# Patient Record
Sex: FEMALE | Race: WHITE | Employment: FULL TIME | URBAN - METROPOLITAN AREA
[De-identification: names, ages, dates, MRNs, and addresses within clinical notes are randomized per-mention and may not be internally consistent; named-entity substitution may affect disease eponyms.]

---

## 2017-11-06 PROBLEM — J30.9 ALLERGIC RHINITIS: Status: ACTIVE | Noted: 2017-11-06

## 2017-11-06 RX ORDER — ALBUTEROL SULFATE 90 UG/1
2 AEROSOL, METERED RESPIRATORY (INHALATION) 2 TIMES DAILY
COMMUNITY
Start: 2005-01-12

## 2018-06-18 ENCOUNTER — TELEPHONE (OUTPATIENT)
Dept: OBGYN CLINIC | Age: 23
End: 2018-06-18

## 2018-06-18 DIAGNOSIS — N91.2 AMENORRHEA: Primary | ICD-10-CM

## 2018-06-21 ENCOUNTER — TELEPHONE (OUTPATIENT)
Dept: OBGYN CLINIC | Age: 23
End: 2018-06-21

## 2018-06-22 ENCOUNTER — HOSPITAL ENCOUNTER (OUTPATIENT)
Dept: ULTRASOUND IMAGING | Age: 23
Discharge: HOME | End: 2018-06-24

## 2018-06-22 DIAGNOSIS — N91.2 AMENORRHEA: ICD-10-CM

## 2018-06-22 PROCEDURE — 76801 OB US < 14 WKS SINGLE FETUS: CPT

## 2018-06-22 PROCEDURE — 76817 TRANSVAGINAL US OBSTETRIC: CPT

## 2018-06-25 ENCOUNTER — TELEPHONE (OUTPATIENT)
Dept: OBGYN CLINIC | Age: 23
End: 2018-06-25

## 2020-01-26 ENCOUNTER — HOSPITAL ENCOUNTER (EMERGENCY)
Age: 25
Discharge: HOME | End: 2020-01-26
Payer: COMMERCIAL

## 2020-01-26 ENCOUNTER — APPOINTMENT (OUTPATIENT)
Dept: GENERAL RADIOLOGY | Age: 25
End: 2020-01-26
Payer: COMMERCIAL

## 2020-01-26 VITALS
RESPIRATION RATE: 18 BRPM | BODY MASS INDEX: 35.85 KG/M2 | DIASTOLIC BLOOD PRESSURE: 87 MMHG | TEMPERATURE: 99 F | SYSTOLIC BLOOD PRESSURE: 135 MMHG | HEART RATE: 69 BPM | WEIGHT: 210 LBS | HEIGHT: 64 IN | OXYGEN SATURATION: 100 %

## 2020-01-26 PROCEDURE — 73630 X-RAY EXAM OF FOOT: CPT

## 2020-01-26 PROCEDURE — 99283 EMERGENCY DEPT VISIT LOW MDM: CPT

## 2020-01-26 PROCEDURE — 6370000000 HC RX 637 (ALT 250 FOR IP): Performed by: NURSE PRACTITIONER

## 2020-01-26 RX ORDER — OXYCODONE HYDROCHLORIDE AND ACETAMINOPHEN 5; 325 MG/1; MG/1
1 TABLET ORAL ONCE
Status: COMPLETED | OUTPATIENT
Start: 2020-01-26 | End: 2020-01-26

## 2020-01-26 RX ORDER — NAPROXEN 500 MG/1
500 TABLET ORAL 2 TIMES DAILY
Qty: 20 TABLET | Refills: 0 | Status: SHIPPED | OUTPATIENT
Start: 2020-01-26 | End: 2020-02-05

## 2020-01-26 RX ORDER — LIDOCAINE HYDROCHLORIDE 10 MG/ML
5 INJECTION, SOLUTION EPIDURAL; INFILTRATION; INTRACAUDAL; PERINEURAL ONCE
Status: DISCONTINUED | OUTPATIENT
Start: 2020-01-26 | End: 2020-01-27 | Stop reason: HOSPADM

## 2020-01-26 RX ORDER — AMOXICILLIN AND CLAVULANATE POTASSIUM 875; 125 MG/1; MG/1
1 TABLET, FILM COATED ORAL 2 TIMES DAILY
Qty: 20 TABLET | Refills: 0 | Status: SHIPPED | OUTPATIENT
Start: 2020-01-26 | End: 2020-02-05

## 2020-01-26 RX ORDER — NAPROXEN 500 MG/1
500 TABLET ORAL ONCE
Status: COMPLETED | OUTPATIENT
Start: 2020-01-26 | End: 2020-01-26

## 2020-01-26 RX ORDER — AMOXICILLIN AND CLAVULANATE POTASSIUM 875; 125 MG/1; MG/1
1 TABLET, FILM COATED ORAL ONCE
Status: COMPLETED | OUTPATIENT
Start: 2020-01-26 | End: 2020-01-26

## 2020-01-26 RX ADMIN — NAPROXEN 500 MG: 500 TABLET ORAL at 21:10

## 2020-01-26 RX ADMIN — OXYCODONE HYDROCHLORIDE AND ACETAMINOPHEN 1 TABLET: 5; 325 TABLET ORAL at 22:04

## 2020-01-26 RX ADMIN — AMOXICILLIN AND CLAVULANATE POTASSIUM 1 TABLET: 875; 125 TABLET, FILM COATED ORAL at 21:10

## 2020-01-26 ASSESSMENT — ENCOUNTER SYMPTOMS
TROUBLE SWALLOWING: 0
BLOOD IN STOOL: 0
SORE THROAT: 0
EYE DISCHARGE: 0
DIARRHEA: 0
COUGH: 0
WHEEZING: 0
NAUSEA: 0
EYE PAIN: 0
EYE REDNESS: 0
BACK PAIN: 0
COLOR CHANGE: 0
SHORTNESS OF BREATH: 0
RHINORRHEA: 0
VOMITING: 0
ABDOMINAL PAIN: 0
CONSTIPATION: 0

## 2020-01-26 ASSESSMENT — PAIN DESCRIPTION - ORIENTATION: ORIENTATION: RIGHT;LOWER

## 2020-01-26 ASSESSMENT — PAIN SCALES - GENERAL
PAINLEVEL_OUTOF10: 2

## 2020-01-26 ASSESSMENT — PAIN DESCRIPTION - PAIN TYPE: TYPE: ACUTE PAIN

## 2020-01-26 ASSESSMENT — PAIN DESCRIPTION - LOCATION: LOCATION: FOOT

## 2020-01-27 NOTE — ED PROVIDER NOTES
dysuria, flank pain and hematuria. Musculoskeletal: Positive for myalgias. Negative for arthralgias and back pain. Skin: Negative for color change, rash and wound. Neurological: Negative for dizziness, syncope, weakness, light-headedness and headaches. Psychiatric/Behavioral: Negative for behavioral problems. All other systems reviewed and are negative. Except as noted above the remainder of the review of systems was reviewed and negative. PAST MEDICAL HISTORY   History reviewed. No pertinent past medical history. History reviewed. No pertinent surgical history. Social History     Socioeconomic History    Marital status: Single     Spouse name: None    Number of children: None    Years of education: None    Highest education level: None   Occupational History    None   Social Needs    Financial resource strain: None    Food insecurity:     Worry: None     Inability: None    Transportation needs:     Medical: None     Non-medical: None   Tobacco Use    Smoking status: Current Every Day Smoker    Smokeless tobacco: Never Used   Substance and Sexual Activity    Alcohol use:  Yes    Drug use: Never    Sexual activity: None   Lifestyle    Physical activity:     Days per week: None     Minutes per session: None    Stress: None   Relationships    Social connections:     Talks on phone: None     Gets together: None     Attends Yazidi service: None     Active member of club or organization: None     Attends meetings of clubs or organizations: None     Relationship status: None    Intimate partner violence:     Fear of current or ex partner: None     Emotionally abused: None     Physically abused: None     Forced sexual activity: None   Other Topics Concern    None   Social History Narrative    None       SCREENINGS             PHYSICAL EXAM    (up to 7 for level 4, 8 or more for level 5)     ED Triage Vitals [01/26/20 2025]   BP Temp Temp Source Pulse Resp SpO2 Height Weight none    LABS:  Labs Reviewed - No data to display    All other labs were within normal range or not returned as of this dictation. EMERGENCY DEPARTMENT COURSE and DIFFERENTIAL DIAGNOSIS/MDM:   Vitals:    Vitals:    01/26/20 2025   BP: 135/87   Pulse: 69   Resp: 18   Temp: 99 °F (37.2 °C)   TempSrc: Oral   SpO2: 100%   Weight: 210 lb (95.3 kg)   Height: 5' 4\" (1.626 m)            MDM   Patient is a 20-year-old female presenting to the ER for chief complaint of a foreign body in her right. She is hemodynamically stable, nontoxic-appearing, does not appear to be in any acute distress. Patient's x-ray is clear. She is medicated with Percocet and Naprosyn for her pain. A small incision, approximately 2 to 3 mm made in the site of erythema to relieve pressure and ensure no foreign body is superficially under the skin. No foreign body identified. Patient is given a prescription for Augmentin and first dose in the emergency department. She is also given a prescription for Naprosyn. Direct pressure applied to the area of incision until it no longer was bleeding. Patient is instructed to keep the dressing on it. She instructed to follow-up with her primary care doctor and to return back if her symptoms worsen anyway or new symptoms develop. Discharged in stable condition with stable vital signs. CRITICAL CARE TIME       CONSULTS:  None    PROCEDURES:  Unless otherwise noted below, none     Procedures    FINAL IMPRESSION      1.  Foreign body in right foot, initial encounter          DISPOSITION/PLAN   DISPOSITION Decision To Discharge 01/26/2020 09:55:23 PM      PATIENT REFERRED TO:  Justin Antoine, [address] [place] [address],[address] [facility]  [address] [place] [address]  [phone]    Schedule an appointment as soon as possible for a visit in 1 day        DISCHARGE MEDICATIONS:  New Prescriptions    AMOXICILLIN-CLAVULANATE (AUGMENTIN) 875-125 MG PER TABLET    Take 1 tablet by mouth 2 times daily for 10 days NAPROXEN (NAPROSYN) 500 MG TABLET    Take 1 tablet by mouth 2 times daily for 20 doses          (Please notethat portions of this note were completed with a voice recognition program.  Efforts were made to edit the dictations but occasionally words are mis-transcribed.)    JAMES Wei CNP (electronically signed)  Attending Emergency Physician         [facility], APRN - CNP  01/26/20 4441
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
## 2023-12-26 ENCOUNTER — APPOINTMENT (OUTPATIENT)
Dept: RADIOLOGY | Facility: HOSPITAL | Age: 28
End: 2023-12-26

## 2023-12-26 ENCOUNTER — ANESTHESIA (OUTPATIENT)
Dept: OPERATING ROOM | Facility: HOSPITAL | Age: 28
End: 2023-12-26

## 2023-12-26 ENCOUNTER — HOSPITAL ENCOUNTER (OUTPATIENT)
Facility: HOSPITAL | Age: 28
Setting detail: OBSERVATION
Discharge: HOME | End: 2023-12-26
Attending: STUDENT IN AN ORGANIZED HEALTH CARE EDUCATION/TRAINING PROGRAM | Admitting: STUDENT IN AN ORGANIZED HEALTH CARE EDUCATION/TRAINING PROGRAM

## 2023-12-26 ENCOUNTER — HOSPITAL ENCOUNTER (EMERGENCY)
Facility: HOSPITAL | Age: 28
Discharge: OTHER INSTITUTION | End: 2023-12-26
Attending: EMERGENCY MEDICINE

## 2023-12-26 ENCOUNTER — ANESTHESIA EVENT (OUTPATIENT)
Dept: OPERATING ROOM | Facility: HOSPITAL | Age: 28
End: 2023-12-26

## 2023-12-26 VITALS
OXYGEN SATURATION: 95 % | HEART RATE: 69 BPM | TEMPERATURE: 98.4 F | HEIGHT: 64 IN | RESPIRATION RATE: 18 BRPM | WEIGHT: 220 LBS | BODY MASS INDEX: 37.56 KG/M2 | SYSTOLIC BLOOD PRESSURE: 91 MMHG | DIASTOLIC BLOOD PRESSURE: 66 MMHG

## 2023-12-26 VITALS
TEMPERATURE: 97.8 F | HEART RATE: 93 BPM | WEIGHT: 220 LBS | SYSTOLIC BLOOD PRESSURE: 139 MMHG | RESPIRATION RATE: 26 BRPM | HEIGHT: 64 IN | BODY MASS INDEX: 37.56 KG/M2 | DIASTOLIC BLOOD PRESSURE: 76 MMHG | OXYGEN SATURATION: 100 %

## 2023-12-26 DIAGNOSIS — R10.30 LOWER ABDOMINAL PAIN: ICD-10-CM

## 2023-12-26 DIAGNOSIS — K65.9 PERITONITIS (MULTI): ICD-10-CM

## 2023-12-26 DIAGNOSIS — Z3A.01 LESS THAN 8 WEEKS GESTATION OF PREGNANCY (HHS-HCC): ICD-10-CM

## 2023-12-26 DIAGNOSIS — R10.31 RLQ ABDOMINAL PAIN: Primary | ICD-10-CM

## 2023-12-26 DIAGNOSIS — O36.80X0 PREGNANCY OF UNKNOWN ANATOMIC LOCATION (HHS-HCC): Primary | ICD-10-CM

## 2023-12-26 PROBLEM — E66.09 OTHER OBESITY DUE TO EXCESS CALORIES: Status: ACTIVE | Noted: 2023-12-26

## 2023-12-26 PROBLEM — N20.0 NEPHROLITHIASIS: Status: ACTIVE | Noted: 2023-12-26

## 2023-12-26 LAB
ABO GROUP (TYPE) IN BLOOD: NORMAL
ABO GROUP (TYPE) IN BLOOD: NORMAL
ALBUMIN SERPL BCP-MCNC: 4.4 G/DL (ref 3.4–5)
ALP SERPL-CCNC: 59 U/L (ref 33–110)
ALT SERPL W P-5'-P-CCNC: 11 U/L (ref 7–45)
ANION GAP SERPL CALC-SCNC: 12 MMOL/L (ref 10–20)
ANION GAP SERPL CALC-SCNC: 13 MMOL/L (ref 10–20)
ANTIBODY SCREEN: NORMAL
AST SERPL W P-5'-P-CCNC: 13 U/L (ref 9–39)
B-HCG SERPL-ACNC: 2492 MIU/ML
BASOPHILS # BLD AUTO: 0.02 X10*3/UL (ref 0–0.1)
BASOPHILS NFR BLD AUTO: 0.2 %
BILIRUB SERPL-MCNC: 0.5 MG/DL (ref 0–1.2)
BUN SERPL-MCNC: 7 MG/DL (ref 6–23)
BUN SERPL-MCNC: 8 MG/DL (ref 6–23)
CALCIUM SERPL-MCNC: 9.2 MG/DL (ref 8.6–10.6)
CALCIUM SERPL-MCNC: 9.6 MG/DL (ref 8.6–10.3)
CHLORIDE SERPL-SCNC: 102 MMOL/L (ref 98–107)
CHLORIDE SERPL-SCNC: 104 MMOL/L (ref 98–107)
CO2 SERPL-SCNC: 25 MMOL/L (ref 21–32)
CO2 SERPL-SCNC: 25 MMOL/L (ref 21–32)
CREAT SERPL-MCNC: 0.71 MG/DL (ref 0.5–1.05)
CREAT SERPL-MCNC: 0.74 MG/DL (ref 0.5–1.05)
EOSINOPHIL # BLD AUTO: 0.04 X10*3/UL (ref 0–0.7)
EOSINOPHIL NFR BLD AUTO: 0.4 %
ERYTHROCYTE [DISTWIDTH] IN BLOOD BY AUTOMATED COUNT: 13.3 % (ref 11.5–14.5)
ERYTHROCYTE [DISTWIDTH] IN BLOOD BY AUTOMATED COUNT: 13.4 % (ref 11.5–14.5)
ERYTHROCYTE [DISTWIDTH] IN BLOOD BY AUTOMATED COUNT: 13.4 % (ref 11.5–14.5)
GFR SERPL CREATININE-BSD FRML MDRD: >90 ML/MIN/1.73M*2
GFR SERPL CREATININE-BSD FRML MDRD: >90 ML/MIN/1.73M*2
GLUCOSE SERPL-MCNC: 74 MG/DL (ref 74–99)
GLUCOSE SERPL-MCNC: 77 MG/DL (ref 74–99)
HCT VFR BLD AUTO: 34.8 % (ref 36–46)
HCT VFR BLD AUTO: 38 % (ref 36–46)
HCT VFR BLD AUTO: 40.6 % (ref 36–46)
HGB BLD-MCNC: 11.3 G/DL (ref 12–16)
HGB BLD-MCNC: 12.3 G/DL (ref 12–16)
HGB BLD-MCNC: 13.5 G/DL (ref 12–16)
IMM GRANULOCYTES # BLD AUTO: 0.05 X10*3/UL (ref 0–0.7)
IMM GRANULOCYTES NFR BLD AUTO: 0.6 % (ref 0–0.9)
LYMPHOCYTES # BLD AUTO: 2.89 X10*3/UL (ref 1.2–4.8)
LYMPHOCYTES NFR BLD AUTO: 32 %
MAGNESIUM SERPL-MCNC: 1.97 MG/DL (ref 1.6–2.4)
MCH RBC QN AUTO: 28 PG (ref 26–34)
MCH RBC QN AUTO: 28.3 PG (ref 26–34)
MCH RBC QN AUTO: 28.3 PG (ref 26–34)
MCHC RBC AUTO-ENTMCNC: 32.4 G/DL (ref 32–36)
MCHC RBC AUTO-ENTMCNC: 32.5 G/DL (ref 32–36)
MCHC RBC AUTO-ENTMCNC: 33.3 G/DL (ref 32–36)
MCV RBC AUTO: 85 FL (ref 80–100)
MCV RBC AUTO: 87 FL (ref 80–100)
MCV RBC AUTO: 87 FL (ref 80–100)
MONOCYTES # BLD AUTO: 0.53 X10*3/UL (ref 0.1–1)
MONOCYTES NFR BLD AUTO: 5.9 %
NEUTROPHILS # BLD AUTO: 5.5 X10*3/UL (ref 1.2–7.7)
NEUTROPHILS NFR BLD AUTO: 60.9 %
NRBC BLD-RTO: 0 /100 WBCS (ref 0–0)
PLATELET # BLD AUTO: 278 X10*3/UL (ref 150–450)
PLATELET # BLD AUTO: 279 X10*3/UL (ref 150–450)
PLATELET # BLD AUTO: 317 X10*3/UL (ref 150–450)
POTASSIUM SERPL-SCNC: 3.6 MMOL/L (ref 3.5–5.3)
POTASSIUM SERPL-SCNC: 4 MMOL/L (ref 3.5–5.3)
PROT SERPL-MCNC: 7.1 G/DL (ref 6.4–8.2)
RBC # BLD AUTO: 3.99 X10*6/UL (ref 4–5.2)
RBC # BLD AUTO: 4.39 X10*6/UL (ref 4–5.2)
RBC # BLD AUTO: 4.77 X10*6/UL (ref 4–5.2)
RH FACTOR (ANTIGEN D): NORMAL
RH FACTOR (ANTIGEN D): NORMAL
SODIUM SERPL-SCNC: 136 MMOL/L (ref 136–145)
SODIUM SERPL-SCNC: 137 MMOL/L (ref 136–145)
WBC # BLD AUTO: 11.3 X10*3/UL (ref 4.4–11.3)
WBC # BLD AUTO: 12.1 X10*3/UL (ref 4.4–11.3)
WBC # BLD AUTO: 9 X10*3/UL (ref 4.4–11.3)

## 2023-12-26 PROCEDURE — 84702 CHORIONIC GONADOTROPIN TEST: CPT | Performed by: EMERGENCY MEDICINE

## 2023-12-26 PROCEDURE — 83735 ASSAY OF MAGNESIUM: CPT | Performed by: EMERGENCY MEDICINE

## 2023-12-26 PROCEDURE — 3600000008 HC OR TIME - EACH INCREMENTAL 1 MINUTE - PROCEDURE LEVEL THREE: Performed by: STUDENT IN AN ORGANIZED HEALTH CARE EDUCATION/TRAINING PROGRAM

## 2023-12-26 PROCEDURE — 2500000004 HC RX 250 GENERAL PHARMACY W/ HCPCS (ALT 636 FOR OP/ED)

## 2023-12-26 PROCEDURE — 80048 BASIC METABOLIC PNL TOTAL CA: CPT | Performed by: STUDENT IN AN ORGANIZED HEALTH CARE EDUCATION/TRAINING PROGRAM

## 2023-12-26 PROCEDURE — 36415 COLL VENOUS BLD VENIPUNCTURE: CPT | Performed by: EMERGENCY MEDICINE

## 2023-12-26 PROCEDURE — 76817 TRANSVAGINAL US OBSTETRIC: CPT

## 2023-12-26 PROCEDURE — 7100000002 HC RECOVERY ROOM TIME - EACH INCREMENTAL 1 MINUTE: Performed by: STUDENT IN AN ORGANIZED HEALTH CARE EDUCATION/TRAINING PROGRAM

## 2023-12-26 PROCEDURE — 88305 TISSUE EXAM BY PATHOLOGIST: CPT | Mod: TC,SUR | Performed by: STUDENT IN AN ORGANIZED HEALTH CARE EDUCATION/TRAINING PROGRAM

## 2023-12-26 PROCEDURE — 7100000001 HC RECOVERY ROOM TIME - INITIAL BASE CHARGE: Performed by: STUDENT IN AN ORGANIZED HEALTH CARE EDUCATION/TRAINING PROGRAM

## 2023-12-26 PROCEDURE — 99140 ANES COMP EMERGENCY COND: CPT | Performed by: STUDENT IN AN ORGANIZED HEALTH CARE EDUCATION/TRAINING PROGRAM

## 2023-12-26 PROCEDURE — 99222 1ST HOSP IP/OBS MODERATE 55: CPT | Performed by: STUDENT IN AN ORGANIZED HEALTH CARE EDUCATION/TRAINING PROGRAM

## 2023-12-26 PROCEDURE — 3600000003 HC OR TIME - INITIAL BASE CHARGE - PROCEDURE LEVEL THREE: Performed by: STUDENT IN AN ORGANIZED HEALTH CARE EDUCATION/TRAINING PROGRAM

## 2023-12-26 PROCEDURE — 96365 THER/PROPH/DIAG IV INF INIT: CPT

## 2023-12-26 PROCEDURE — 2500000001 HC RX 250 WO HCPCS SELF ADMINISTERED DRUGS (ALT 637 FOR MEDICARE OP): Performed by: STUDENT IN AN ORGANIZED HEALTH CARE EDUCATION/TRAINING PROGRAM

## 2023-12-26 PROCEDURE — 85025 COMPLETE CBC W/AUTO DIFF WBC: CPT | Performed by: EMERGENCY MEDICINE

## 2023-12-26 PROCEDURE — 96361 HYDRATE IV INFUSION ADD-ON: CPT

## 2023-12-26 PROCEDURE — G0378 HOSPITAL OBSERVATION PER HR: HCPCS

## 2023-12-26 PROCEDURE — 76801 OB US < 14 WKS SINGLE FETUS: CPT

## 2023-12-26 PROCEDURE — 87086 URINE CULTURE/COLONY COUNT: CPT | Performed by: STUDENT IN AN ORGANIZED HEALTH CARE EDUCATION/TRAINING PROGRAM

## 2023-12-26 PROCEDURE — G0379 DIRECT REFER HOSPITAL OBSERV: HCPCS

## 2023-12-26 PROCEDURE — A4217 STERILE WATER/SALINE, 500 ML: HCPCS | Performed by: STUDENT IN AN ORGANIZED HEALTH CARE EDUCATION/TRAINING PROGRAM

## 2023-12-26 PROCEDURE — 3700000001 HC GENERAL ANESTHESIA TIME - INITIAL BASE CHARGE: Performed by: STUDENT IN AN ORGANIZED HEALTH CARE EDUCATION/TRAINING PROGRAM

## 2023-12-26 PROCEDURE — 2500000004 HC RX 250 GENERAL PHARMACY W/ HCPCS (ALT 636 FOR OP/ED): Performed by: STUDENT IN AN ORGANIZED HEALTH CARE EDUCATION/TRAINING PROGRAM

## 2023-12-26 PROCEDURE — 86850 RBC ANTIBODY SCREEN: CPT | Performed by: STUDENT IN AN ORGANIZED HEALTH CARE EDUCATION/TRAINING PROGRAM

## 2023-12-26 PROCEDURE — 86901 BLOOD TYPING SEROLOGIC RH(D): CPT

## 2023-12-26 PROCEDURE — 3700000002 HC GENERAL ANESTHESIA TIME - EACH INCREMENTAL 1 MINUTE: Performed by: STUDENT IN AN ORGANIZED HEALTH CARE EDUCATION/TRAINING PROGRAM

## 2023-12-26 PROCEDURE — 88305 TISSUE EXAM BY PATHOLOGIST: CPT | Performed by: PATHOLOGY

## 2023-12-26 PROCEDURE — 86901 BLOOD TYPING SEROLOGIC RH(D): CPT | Performed by: STUDENT IN AN ORGANIZED HEALTH CARE EDUCATION/TRAINING PROGRAM

## 2023-12-26 PROCEDURE — 96374 THER/PROPH/DIAG INJ IV PUSH: CPT | Mod: 59

## 2023-12-26 PROCEDURE — 85027 COMPLETE CBC AUTOMATED: CPT | Performed by: EMERGENCY MEDICINE

## 2023-12-26 PROCEDURE — 58661 LAPAROSCOPY REMOVE ADNEXA: CPT | Performed by: STUDENT IN AN ORGANIZED HEALTH CARE EDUCATION/TRAINING PROGRAM

## 2023-12-26 PROCEDURE — 76815 OB US LIMITED FETUS(S): CPT

## 2023-12-26 PROCEDURE — 80053 COMPREHEN METABOLIC PANEL: CPT | Performed by: EMERGENCY MEDICINE

## 2023-12-26 PROCEDURE — 96376 TX/PRO/DX INJ SAME DRUG ADON: CPT

## 2023-12-26 PROCEDURE — 2500000004 HC RX 250 GENERAL PHARMACY W/ HCPCS (ALT 636 FOR OP/ED): Performed by: EMERGENCY MEDICINE

## 2023-12-26 PROCEDURE — 2720000007 HC OR 272 NO HCPCS: Performed by: STUDENT IN AN ORGANIZED HEALTH CARE EDUCATION/TRAINING PROGRAM

## 2023-12-26 PROCEDURE — 99285 EMERGENCY DEPT VISIT HI MDM: CPT | Performed by: EMERGENCY MEDICINE

## 2023-12-26 PROCEDURE — 96375 TX/PRO/DX INJ NEW DRUG ADDON: CPT

## 2023-12-26 PROCEDURE — A59151 PR RX ECTOP PREG BY SCOPE,RMV TUBE/OVRY: Performed by: STUDENT IN AN ORGANIZED HEALTH CARE EDUCATION/TRAINING PROGRAM

## 2023-12-26 PROCEDURE — 85027 COMPLETE CBC AUTOMATED: CPT | Performed by: STUDENT IN AN ORGANIZED HEALTH CARE EDUCATION/TRAINING PROGRAM

## 2023-12-26 RX ORDER — ACETAMINOPHEN 325 MG/1
975 TABLET ORAL ONCE
Status: COMPLETED | OUTPATIENT
Start: 2023-12-26 | End: 2023-12-26

## 2023-12-26 RX ORDER — ONDANSETRON HYDROCHLORIDE 2 MG/ML
4 INJECTION, SOLUTION INTRAVENOUS ONCE
Status: COMPLETED | OUTPATIENT
Start: 2023-12-26 | End: 2023-12-26

## 2023-12-26 RX ORDER — FENTANYL CITRATE 50 UG/ML
INJECTION, SOLUTION INTRAMUSCULAR; INTRAVENOUS AS NEEDED
Status: DISCONTINUED | OUTPATIENT
Start: 2023-12-26 | End: 2023-12-26

## 2023-12-26 RX ORDER — DROPERIDOL 2.5 MG/ML
0.62 INJECTION, SOLUTION INTRAMUSCULAR; INTRAVENOUS ONCE AS NEEDED
Status: DISCONTINUED | OUTPATIENT
Start: 2023-12-26 | End: 2023-12-26 | Stop reason: HOSPADM

## 2023-12-26 RX ORDER — ROCURONIUM BROMIDE 10 MG/ML
INJECTION, SOLUTION INTRAVENOUS AS NEEDED
Status: DISCONTINUED | OUTPATIENT
Start: 2023-12-26 | End: 2023-12-26

## 2023-12-26 RX ORDER — ONDANSETRON HYDROCHLORIDE 2 MG/ML
4 INJECTION, SOLUTION INTRAVENOUS EVERY 6 HOURS PRN
Status: DISCONTINUED | OUTPATIENT
Start: 2023-12-26 | End: 2023-12-27 | Stop reason: HOSPADM

## 2023-12-26 RX ORDER — LIDOCAINE HYDROCHLORIDE 10 MG/ML
0.1 INJECTION INFILTRATION; PERINEURAL ONCE
Status: DISCONTINUED | OUTPATIENT
Start: 2023-12-26 | End: 2023-12-26 | Stop reason: HOSPADM

## 2023-12-26 RX ORDER — SODIUM CHLORIDE 0.9 G/100ML
IRRIGANT IRRIGATION AS NEEDED
Status: DISCONTINUED | OUTPATIENT
Start: 2023-12-26 | End: 2023-12-26 | Stop reason: HOSPADM

## 2023-12-26 RX ORDER — HYDROMORPHONE HYDROCHLORIDE 1 MG/ML
0.2 INJECTION, SOLUTION INTRAMUSCULAR; INTRAVENOUS; SUBCUTANEOUS EVERY 5 MIN PRN
Status: DISCONTINUED | OUTPATIENT
Start: 2023-12-26 | End: 2023-12-26 | Stop reason: HOSPADM

## 2023-12-26 RX ORDER — IBUPROFEN 600 MG/1
600 TABLET ORAL ONCE
Status: COMPLETED | OUTPATIENT
Start: 2023-12-26 | End: 2023-12-26

## 2023-12-26 RX ORDER — ONDANSETRON 4 MG/1
4 TABLET, FILM COATED ORAL EVERY 6 HOURS PRN
Qty: 20 TABLET | Refills: 0 | Status: SHIPPED | OUTPATIENT
Start: 2023-12-26 | End: 2024-01-02

## 2023-12-26 RX ORDER — SODIUM CHLORIDE, SODIUM LACTATE, POTASSIUM CHLORIDE, CALCIUM CHLORIDE 600; 310; 30; 20 MG/100ML; MG/100ML; MG/100ML; MG/100ML
100 INJECTION, SOLUTION INTRAVENOUS CONTINUOUS
Status: DISCONTINUED | OUTPATIENT
Start: 2023-12-26 | End: 2023-12-26 | Stop reason: HOSPADM

## 2023-12-26 RX ORDER — CEFTRIAXONE 1 G/50ML
1 INJECTION, SOLUTION INTRAVENOUS ONCE
Status: COMPLETED | OUTPATIENT
Start: 2023-12-26 | End: 2023-12-26

## 2023-12-26 RX ORDER — BISMUTH SUBSALICYLATE 262 MG
1 TABLET,CHEWABLE ORAL DAILY
COMMUNITY
End: 2024-01-10 | Stop reason: ALTCHOICE

## 2023-12-26 RX ORDER — OXYCODONE HYDROCHLORIDE 5 MG/1
5 TABLET ORAL EVERY 6 HOURS PRN
Qty: 12 TABLET | Refills: 0 | Status: SHIPPED | OUTPATIENT
Start: 2023-12-26 | End: 2024-01-02

## 2023-12-26 RX ORDER — MIDAZOLAM HYDROCHLORIDE 1 MG/ML
INJECTION INTRAMUSCULAR; INTRAVENOUS AS NEEDED
Status: DISCONTINUED | OUTPATIENT
Start: 2023-12-26 | End: 2023-12-26

## 2023-12-26 RX ORDER — ACETAMINOPHEN 325 MG/1
650 TABLET ORAL EVERY 4 HOURS PRN
Status: DISCONTINUED | OUTPATIENT
Start: 2023-12-26 | End: 2023-12-26 | Stop reason: HOSPADM

## 2023-12-26 RX ORDER — AMOXICILLIN 250 MG
1 CAPSULE ORAL 2 TIMES DAILY
Qty: 30 TABLET | Refills: 0 | Status: SHIPPED | OUTPATIENT
Start: 2023-12-26 | End: 2024-01-10 | Stop reason: ALTCHOICE

## 2023-12-26 RX ORDER — LIDOCAINE HCL/PF 100 MG/5ML
SYRINGE (ML) INTRAVENOUS AS NEEDED
Status: DISCONTINUED | OUTPATIENT
Start: 2023-12-26 | End: 2023-12-26

## 2023-12-26 RX ORDER — HYDROMORPHONE HYDROCHLORIDE 1 MG/ML
0.5 INJECTION, SOLUTION INTRAMUSCULAR; INTRAVENOUS; SUBCUTANEOUS EVERY 5 MIN PRN
Status: DISCONTINUED | OUTPATIENT
Start: 2023-12-26 | End: 2023-12-26 | Stop reason: HOSPADM

## 2023-12-26 RX ORDER — PROPOFOL 10 MG/ML
INJECTION, EMULSION INTRAVENOUS AS NEEDED
Status: DISCONTINUED | OUTPATIENT
Start: 2023-12-26 | End: 2023-12-26

## 2023-12-26 RX ORDER — IBUPROFEN 600 MG/1
600 TABLET ORAL EVERY 6 HOURS PRN
Qty: 20 TABLET | Refills: 0 | Status: SHIPPED | OUTPATIENT
Start: 2023-12-26 | End: 2024-01-05

## 2023-12-26 RX ORDER — OXYCODONE HYDROCHLORIDE 5 MG/1
5 TABLET ORAL ONCE
Status: COMPLETED | OUTPATIENT
Start: 2023-12-26 | End: 2023-12-26

## 2023-12-26 RX ORDER — ONDANSETRON HYDROCHLORIDE 2 MG/ML
INJECTION, SOLUTION INTRAVENOUS
Status: COMPLETED
Start: 2023-12-26 | End: 2023-12-26

## 2023-12-26 RX ORDER — LABETALOL HYDROCHLORIDE 5 MG/ML
5 INJECTION, SOLUTION INTRAVENOUS ONCE AS NEEDED
Status: DISCONTINUED | OUTPATIENT
Start: 2023-12-26 | End: 2023-12-26 | Stop reason: HOSPADM

## 2023-12-26 RX ORDER — ONDANSETRON HYDROCHLORIDE 2 MG/ML
4 INJECTION, SOLUTION INTRAVENOUS ONCE AS NEEDED
Status: DISCONTINUED | OUTPATIENT
Start: 2023-12-26 | End: 2023-12-26 | Stop reason: HOSPADM

## 2023-12-26 RX ORDER — ACETAMINOPHEN 500 MG
1000 TABLET ORAL EVERY 6 HOURS PRN
Qty: 30 TABLET | Refills: 0 | Status: SHIPPED | OUTPATIENT
Start: 2023-12-26 | End: 2024-01-10 | Stop reason: ALTCHOICE

## 2023-12-26 RX ORDER — HYDROMORPHONE HYDROCHLORIDE 1 MG/ML
0.2 INJECTION, SOLUTION INTRAMUSCULAR; INTRAVENOUS; SUBCUTANEOUS EVERY 2 HOUR PRN
Status: DISCONTINUED | OUTPATIENT
Start: 2023-12-26 | End: 2023-12-27 | Stop reason: HOSPADM

## 2023-12-26 RX ORDER — BUPIVACAINE HYDROCHLORIDE 5 MG/ML
INJECTION, SOLUTION EPIDURAL; INTRACAUDAL AS NEEDED
Status: DISCONTINUED | OUTPATIENT
Start: 2023-12-26 | End: 2023-12-26 | Stop reason: HOSPADM

## 2023-12-26 RX ORDER — DEXAMETHASONE SODIUM PHOSPHATE 4 MG/ML
INJECTION, SOLUTION INTRA-ARTICULAR; INTRALESIONAL; INTRAMUSCULAR; INTRAVENOUS; SOFT TISSUE AS NEEDED
Status: DISCONTINUED | OUTPATIENT
Start: 2023-12-26 | End: 2023-12-26

## 2023-12-26 RX ADMIN — Medication 100 MG: at 18:43

## 2023-12-26 RX ADMIN — OXYCODONE HYDROCHLORIDE 5 MG: 5 TABLET ORAL at 22:42

## 2023-12-26 RX ADMIN — SODIUM CHLORIDE, POTASSIUM CHLORIDE, SODIUM LACTATE AND CALCIUM CHLORIDE 1000 ML: 600; 310; 30; 20 INJECTION, SOLUTION INTRAVENOUS at 08:00

## 2023-12-26 RX ADMIN — ONDANSETRON HYDROCHLORIDE 4 MG: 2 INJECTION, SOLUTION INTRAVENOUS at 09:50

## 2023-12-26 RX ADMIN — IBUPROFEN 600 MG: 600 TABLET, FILM COATED ORAL at 22:42

## 2023-12-26 RX ADMIN — PROPOFOL 200 MG: 10 INJECTION, EMULSION INTRAVENOUS at 18:43

## 2023-12-26 RX ADMIN — HYDROMORPHONE HYDROCHLORIDE 0.2 MG: 1 INJECTION, SOLUTION INTRAMUSCULAR; INTRAVENOUS; SUBCUTANEOUS at 16:12

## 2023-12-26 RX ADMIN — ACETAMINOPHEN 975 MG: 325 TABLET ORAL at 22:42

## 2023-12-26 RX ADMIN — HYDROMORPHONE HYDROCHLORIDE 0.5 MG: 1 INJECTION, SOLUTION INTRAMUSCULAR; INTRAVENOUS; SUBCUTANEOUS at 14:01

## 2023-12-26 RX ADMIN — HYDROMORPHONE HYDROCHLORIDE 0.5 MG: 1 INJECTION, SOLUTION INTRAMUSCULAR; INTRAVENOUS; SUBCUTANEOUS at 20:04

## 2023-12-26 RX ADMIN — ROCURONIUM BROMIDE 100 MG: 10 INJECTION, SOLUTION INTRAVENOUS at 18:43

## 2023-12-26 RX ADMIN — PROPOFOL 40 MG: 10 INJECTION, EMULSION INTRAVENOUS at 19:43

## 2023-12-26 RX ADMIN — CEFTRIAXONE 1 G: 1 INJECTION, SOLUTION INTRAVENOUS at 12:47

## 2023-12-26 RX ADMIN — HYDROMORPHONE HYDROCHLORIDE 0.5 MG: 1 INJECTION, SOLUTION INTRAMUSCULAR; INTRAVENOUS; SUBCUTANEOUS at 09:49

## 2023-12-26 RX ADMIN — ONDANSETRON HYDROCHLORIDE 4 MG: 2 INJECTION, SOLUTION INTRAVENOUS at 19:27

## 2023-12-26 RX ADMIN — FENTANYL CITRATE 50 MCG: 50 INJECTION, SOLUTION INTRAMUSCULAR; INTRAVENOUS at 18:43

## 2023-12-26 RX ADMIN — MIDAZOLAM HYDROCHLORIDE 2 MG: 1 INJECTION INTRAMUSCULAR; INTRAVENOUS at 18:30

## 2023-12-26 RX ADMIN — FENTANYL CITRATE 50 MCG: 50 INJECTION, SOLUTION INTRAMUSCULAR; INTRAVENOUS at 19:05

## 2023-12-26 RX ADMIN — HYDROMORPHONE HYDROCHLORIDE 0.4 MG: 1 INJECTION, SOLUTION INTRAMUSCULAR; INTRAVENOUS; SUBCUTANEOUS at 19:03

## 2023-12-26 RX ADMIN — ONDANSETRON 4 MG: 2 INJECTION INTRAMUSCULAR; INTRAVENOUS at 09:50

## 2023-12-26 RX ADMIN — DEXAMETHASONE SODIUM PHOSPHATE 10 MG: 4 INJECTION, SOLUTION INTRA-ARTICULAR; INTRALESIONAL; INTRAMUSCULAR; INTRAVENOUS; SOFT TISSUE at 18:43

## 2023-12-26 RX ADMIN — SODIUM CHLORIDE, POTASSIUM CHLORIDE, SODIUM LACTATE AND CALCIUM CHLORIDE 1000 ML: 600; 310; 30; 20 INJECTION, SOLUTION INTRAVENOUS at 12:48

## 2023-12-26 SDOH — SOCIAL STABILITY: SOCIAL INSECURITY
WITHIN THE LAST YEAR, HAVE YOU BEEN KICKED, HIT, SLAPPED, OR OTHERWISE PHYSICALLY HURT BY YOUR PARTNER OR EX-PARTNER?: NO

## 2023-12-26 SDOH — ECONOMIC STABILITY: FOOD INSECURITY: WITHIN THE PAST 12 MONTHS, THE FOOD YOU BOUGHT JUST DIDN'T LAST AND YOU DIDN'T HAVE MONEY TO GET MORE.: NEVER TRUE

## 2023-12-26 SDOH — SOCIAL STABILITY: SOCIAL INSECURITY
WITHIN THE LAST YEAR, HAVE TO BEEN RAPED OR FORCED TO HAVE ANY KIND OF SEXUAL ACTIVITY BY YOUR PARTNER OR EX-PARTNER?: NO

## 2023-12-26 SDOH — HEALTH STABILITY: MENTAL HEALTH
STRESS IS WHEN SOMEONE FEELS TENSE, NERVOUS, ANXIOUS, OR CAN'T SLEEP AT NIGHT BECAUSE THEIR MIND IS TROUBLED. HOW STRESSED ARE YOU?: NOT AT ALL

## 2023-12-26 SDOH — SOCIAL STABILITY: SOCIAL INSECURITY: WITHIN THE LAST YEAR, HAVE YOU BEEN AFRAID OF YOUR PARTNER OR EX-PARTNER?: NO

## 2023-12-26 SDOH — SOCIAL STABILITY: SOCIAL NETWORK: HOW OFTEN DO YOU ATTENT MEETINGS OF THE CLUB OR ORGANIZATION YOU BELONG TO?: NEVER

## 2023-12-26 SDOH — ECONOMIC STABILITY: FOOD INSECURITY: WITHIN THE PAST 12 MONTHS, YOU WORRIED THAT YOUR FOOD WOULD RUN OUT BEFORE YOU GOT MONEY TO BUY MORE.: NEVER TRUE

## 2023-12-26 SDOH — SOCIAL STABILITY: SOCIAL INSECURITY: DO YOU FEEL UNSAFE GOING BACK TO THE PLACE WHERE YOU ARE LIVING?: NO

## 2023-12-26 SDOH — ECONOMIC STABILITY: INCOME INSECURITY: HOW HARD IS IT FOR YOU TO PAY FOR THE VERY BASICS LIKE FOOD, HOUSING, MEDICAL CARE, AND HEATING?: NOT HARD AT ALL

## 2023-12-26 SDOH — HEALTH STABILITY: PHYSICAL HEALTH: ON AVERAGE, HOW MANY DAYS PER WEEK DO YOU ENGAGE IN MODERATE TO STRENUOUS EXERCISE (LIKE A BRISK WALK)?: 2 DAYS

## 2023-12-26 SDOH — SOCIAL STABILITY: SOCIAL NETWORK: HOW OFTEN DO YOU GET TOGETHER WITH FRIENDS OR RELATIVES?: THREE TIMES A WEEK

## 2023-12-26 SDOH — HEALTH STABILITY: PHYSICAL HEALTH: ON AVERAGE, HOW MANY MINUTES DO YOU ENGAGE IN EXERCISE AT THIS LEVEL?: 30 MIN

## 2023-12-26 SDOH — SOCIAL STABILITY: SOCIAL NETWORK
IN A TYPICAL WEEK, HOW MANY TIMES DO YOU TALK ON THE PHONE WITH FAMILY, FRIENDS, OR NEIGHBORS?: MORE THAN THREE TIMES A WEEK

## 2023-12-26 SDOH — ECONOMIC STABILITY: TRANSPORTATION INSECURITY
IN THE PAST 12 MONTHS, HAS THE LACK OF TRANSPORTATION KEPT YOU FROM MEDICAL APPOINTMENTS OR FROM GETTING MEDICATIONS?: NO

## 2023-12-26 SDOH — SOCIAL STABILITY: SOCIAL NETWORK
DO YOU BELONG TO ANY CLUBS OR ORGANIZATIONS SUCH AS CHURCH GROUPS UNIONS, FRATERNAL OR ATHLETIC GROUPS, OR SCHOOL GROUPS?: NO

## 2023-12-26 SDOH — SOCIAL STABILITY: SOCIAL INSECURITY: WITHIN THE LAST YEAR, HAVE YOU BEEN HUMILIATED OR EMOTIONALLY ABUSED IN OTHER WAYS BY YOUR PARTNER OR EX-PARTNER?: NO

## 2023-12-26 SDOH — SOCIAL STABILITY: SOCIAL INSECURITY: ABUSE: ADULT

## 2023-12-26 SDOH — SOCIAL STABILITY: SOCIAL INSECURITY: DOES ANYONE TRY TO KEEP YOU FROM HAVING/CONTACTING OTHER FRIENDS OR DOING THINGS OUTSIDE YOUR HOME?: NO

## 2023-12-26 SDOH — ECONOMIC STABILITY: TRANSPORTATION INSECURITY
IN THE PAST 12 MONTHS, HAS LACK OF TRANSPORTATION KEPT YOU FROM MEETINGS, WORK, OR FROM GETTING THINGS NEEDED FOR DAILY LIVING?: NO

## 2023-12-26 SDOH — SOCIAL STABILITY: SOCIAL INSECURITY: ARE THERE ANY APPARENT SIGNS OF INJURIES/BEHAVIORS THAT COULD BE RELATED TO ABUSE/NEGLECT?: NO

## 2023-12-26 SDOH — SOCIAL STABILITY: SOCIAL NETWORK: HOW OFTEN DO YOU ATTEND CHURCH OR RELIGIOUS SERVICES?: NEVER

## 2023-12-26 SDOH — SOCIAL STABILITY: SOCIAL INSECURITY: DO YOU FEEL ANYONE HAS EXPLOITED OR TAKEN ADVANTAGE OF YOU FINANCIALLY OR OF YOUR PERSONAL PROPERTY?: NO

## 2023-12-26 SDOH — SOCIAL STABILITY: SOCIAL NETWORK: ARE YOU MARRIED, WIDOWED, DIVORCED, SEPARATED, NEVER MARRIED, OR LIVING WITH A PARTNER?: NEVER MARRIED

## 2023-12-26 SDOH — SOCIAL STABILITY: SOCIAL INSECURITY: HAVE YOU HAD THOUGHTS OF HARMING ANYONE ELSE?: NO

## 2023-12-26 SDOH — SOCIAL STABILITY: SOCIAL INSECURITY: ARE YOU OR HAVE YOU BEEN THREATENED OR ABUSED PHYSICALLY, EMOTIONALLY, OR SEXUALLY BY ANYONE?: NO

## 2023-12-26 SDOH — SOCIAL STABILITY: SOCIAL INSECURITY: HAS ANYONE EVER THREATENED TO HURT YOUR FAMILY OR YOUR PETS?: NO

## 2023-12-26 ASSESSMENT — LIFESTYLE VARIABLES
AUDIT-C TOTAL SCORE: 0
HOW MANY STANDARD DRINKS CONTAINING ALCOHOL DO YOU HAVE ON A TYPICAL DAY: PATIENT DOES NOT DRINK
HOW OFTEN DO YOU HAVE 6 OR MORE DRINKS ON ONE OCCASION: NEVER
SKIP TO QUESTIONS 9-10: 1
SUBSTANCE_ABUSE_PAST_12_MONTHS: NO
HOW OFTEN DO YOU HAVE A DRINK CONTAINING ALCOHOL: NEVER
AUDIT-C TOTAL SCORE: 0
PRESCIPTION_ABUSE_PAST_12_MONTHS: NO

## 2023-12-26 ASSESSMENT — ACTIVITIES OF DAILY LIVING (ADL)
FEEDING YOURSELF: INDEPENDENT
WALKS IN HOME: INDEPENDENT
HEARING - RIGHT EAR: FUNCTIONAL
ADEQUATE_TO_COMPLETE_ADL: YES
BATHING: INDEPENDENT
PATIENT'S MEMORY ADEQUATE TO SAFELY COMPLETE DAILY ACTIVITIES?: YES
LACK_OF_TRANSPORTATION: NO
JUDGMENT_ADEQUATE_SAFELY_COMPLETE_DAILY_ACTIVITIES: YES
LACK_OF_TRANSPORTATION: NO
TOILETING: INDEPENDENT
HEARING - LEFT EAR: FUNCTIONAL
GROOMING: INDEPENDENT
DRESSING YOURSELF: INDEPENDENT

## 2023-12-26 ASSESSMENT — COGNITIVE AND FUNCTIONAL STATUS - GENERAL
MOBILITY SCORE: 24
MOBILITY SCORE: 24
DAILY ACTIVITIY SCORE: 24
PATIENT BASELINE BEDBOUND: NO
DAILY ACTIVITIY SCORE: 24

## 2023-12-26 ASSESSMENT — PAIN SCALES - GENERAL
PAINLEVEL_OUTOF10: 10 - WORST POSSIBLE PAIN
PAINLEVEL_OUTOF10: 8
PAINLEVEL_OUTOF10: 9
PAINLEVEL_OUTOF10: 10 - WORST POSSIBLE PAIN
PAINLEVEL_OUTOF10: 7
PAINLEVEL_OUTOF10: 8
PAINLEVEL_OUTOF10: 8
PAINLEVEL_OUTOF10: 7

## 2023-12-26 ASSESSMENT — COLUMBIA-SUICIDE SEVERITY RATING SCALE - C-SSRS
1. IN THE PAST MONTH, HAVE YOU WISHED YOU WERE DEAD OR WISHED YOU COULD GO TO SLEEP AND NOT WAKE UP?: NO
6. HAVE YOU EVER DONE ANYTHING, STARTED TO DO ANYTHING, OR PREPARED TO DO ANYTHING TO END YOUR LIFE?: NO
6. HAVE YOU EVER DONE ANYTHING, STARTED TO DO ANYTHING, OR PREPARED TO DO ANYTHING TO END YOUR LIFE?: NO
1. IN THE PAST MONTH, HAVE YOU WISHED YOU WERE DEAD OR WISHED YOU COULD GO TO SLEEP AND NOT WAKE UP?: NO
2. HAVE YOU ACTUALLY HAD ANY THOUGHTS OF KILLING YOURSELF?: NO
2. HAVE YOU ACTUALLY HAD ANY THOUGHTS OF KILLING YOURSELF?: NO

## 2023-12-26 ASSESSMENT — PAIN - FUNCTIONAL ASSESSMENT
PAIN_FUNCTIONAL_ASSESSMENT: 0-10

## 2023-12-26 ASSESSMENT — PAIN DESCRIPTION - LOCATION
LOCATION: ABDOMEN
LOCATION: ABDOMEN

## 2023-12-26 ASSESSMENT — PATIENT HEALTH QUESTIONNAIRE - PHQ9
SUM OF ALL RESPONSES TO PHQ9 QUESTIONS 1 & 2: 0
1. LITTLE INTEREST OR PLEASURE IN DOING THINGS: NOT AT ALL
2. FEELING DOWN, DEPRESSED OR HOPELESS: NOT AT ALL

## 2023-12-26 ASSESSMENT — PAIN DESCRIPTION - DESCRIPTORS: DESCRIPTORS: SHARP

## 2023-12-26 NOTE — H&P
Gynecology Admission History and Physical    Chief Complaint: No chief complaint on file.    Assessment/Plan    28 y.o.     Pregnancy of unknown location  - HCG 2492, VSS currently, WBC 12, HDS  - Discussed options including risks, benefits, alternatives. Given abdominal pain and guarding on exam, discussed recommendation to proceed to the OR given c/f ectopic pregnancy. Also discussed potential of non diagnostic laparoscopy.  We did discuss diagnostic uterine aspiration however given single quant in the discriminatory zone, early normal IUP does remain in the differential therefore would defer uterine aspiration. Pt amenable to surgical management.  - Will plan to proceed with diagnostic laparoscopy, possible removal of ectopic pregnancy, possible unilateral salpingectomy, any other indicated procedures  - Declines birth control  - NPO   - T&S, CBC pending     Discussed and seen with Dr. Amanuel Ding MD  Gyn Pager [phone]    Subjective   Pt is a 29 yo  who presents as a transfer from [place] with right lower quadrant pain and pregnancy of unknown location. Awoke this morning with sharp, right lower quadrant pain. Attempted to void, did not improve pain. Describes very significant pain at [facility] that improved marginally with Dilaudid, but returned in a few hours.     At [facility], found to have HCG of 2496. H/o right ectopic in . VSS. TVUS with PUL, trace free fluid. Hgb 12.3. Received Dilaudid 0.5 gm x2 for pain, Zofran for nausea, 2 L IVF bolus. She received CTX 1 g x1 in the ED empirically bc unable to obtain urine and WBC of 12.     On arrival, notes she is able to ambulate. No vaginal bleeding. H/o PCOS, irregular menses, LMP in October. Has not voided since 0600, NPO since then as well.     TVUS: 23    Uterus is anteverted and measures 8.5 x 4.7 x 5.9 cm. The endometrium  has a thickness of about 8 mm. No gestational sac or endometrial  fluid collection is identified. There is no focal  "myometrial mass.      Right ovary measures 3.2 x 2.1 x 2.4 cm. Left ovary is only imaged in coronal projection, curtailed by patient tolerance. Ovaries are grossly normal in appearance. Doppler was attempted but adequate waveforms were not obtained. Corpus luteum is not clearly demonstrated.      There is a small amount of free intrapelvic fluid. No definite  adnexal mass is identified.      At the time of dictation the quantitative beta HCG was not available.      IMPRESSION:  No intrauterine pregnancy is identified.      Adnexal evaluation is limited with patient's limited tolerance for  the examination curtailing the imaging. Corpus luteum is not  definitely identified.      There is a small amount of free intrapelvic fluid. An ectopic  pregnancy is not definitely identified but also can not be completely excluded. Spontaneous  is in the differential diagnosis. Endometrial appearance is less likely to support less than 5 week IUP, but this also remains in the differential diagnosis. Correlation with the quantitative beta HCG result will be important.      OB:  x1, ectopic in 2013, likely right salpingostomy based on pathology review, \"multiple miscarriages, unsure how many\" never surgically treated  SurgHx: dx laparoscopy 2013        Obstetrical History   OB History    Para Term  AB Living   4 1 1   2 1   SAB IAB Ectopic Multiple Live Births       1   1      # Outcome Date GA Lbr Anthony/2nd Weight Sex Delivery Anes PTL Lv   4 Current            3 Ectopic      Surgical Conrad      2 Term 2011     Vag-Spont   RAHUL   1 AB                Past Medical History  Past Medical History:   Diagnosis Date    Asthma     Body mass index (BMI) 45.0-49.9, adult (CMS/[facility]) 10/15/2018    BMI 45.0-49.9, adult    Kidney stone     Migraine without aura, not intractable, without status migrainosus     Migraine without aura and without status migrainosus, not intractable    Nicotine dependence, cigarettes, " "uncomplicated     Cigarette nicotine dependence without complication    PCOS (polycystic ovarian syndrome)         Past Surgical History   Past Surgical History:   Procedure Laterality Date    ECTOPIC PREGNANCY SURGERY      KIDNEY SURGERY      TONSILLECTOMY  03/09/2018    Tonsillectomy       Medications  Medications Prior to Admission   Medication Sig Dispense Refill Last Dose    multivitamin tablet Take 1 tablet by mouth once daily. Prenatal vitamin          Allergies  Patient has no known allergies.     Family History  No family history on file.    Social History  Social History     Tobacco Use    Smoking status: Every Day     Types: Cigarettes    Smokeless tobacco: Never   Substance Use Topics    Alcohol use: Yes     Comment: socially     Substance and Sexual Activity   Drug Use Never       Objective    Last Vitals  Temp Pulse Resp BP MAP O2 Sat   37 °C (98.6 °F) 72 20 122/81   100 %     Physical Examination  Physical Exam  Constitutional:       General: She is not in acute distress.     Appearance: Normal appearance.   HENT:      Head: Normocephalic and atraumatic.      Nose: Nose normal.   Eyes:      General: No scleral icterus.  Cardiovascular:      Rate and Rhythm: Normal rate.   Pulmonary:      Effort: Pulmonary effort is normal.   Abdominal:      Palpations: Abdomen is soft.      Comments: No rebound, guarding in the right and left lower quadrant   Musculoskeletal:         General: Normal range of motion.      Cervical back: Normal range of motion.   Skin:     General: Skin is warm and dry.   Neurological:      General: No focal deficit present.      Mental Status: She is alert and oriented to person, place, and time.   Psychiatric:         Mood and Affect: Mood normal.         Behavior: Behavior normal.       Lab Review  No results found for: \"ABO\", \"LABRH\", \"ABSCRN\"  No results found for: \"WBC\", \"HGB\", \"HCT\", \"PLT\"  No results found for: \"GLUCOSE\", \"NA\", \"K\", \"CL\", \"CO2\", \"ANIONGAP\", \"BUN\", \"CREATININE\", " "\"EGFR\", \"CALCIUM\", \"ALBUMIN\", \"PROT\", \"ALKPHOS\", \"ALT\", \"AST\", \"BILITOT\"    "

## 2023-12-26 NOTE — ANESTHESIA PROCEDURE NOTES
Airway  Date/Time: 12/26/2023 6:46 PM  Urgency: elective    Airway not difficult    Staffing  Performed: resident   Authorized by: John Linares MD    Performed by: Chon Dave MD  Patient location during procedure: OR    Indications and Patient Condition  Indications for airway management: anesthesia  Spontaneous ventilation: present  Sedation level: deep  Preoxygenated: yes  Patient position: sniffing  MILS not maintained throughout  Mask difficulty assessment: 0 - not attempted  Planned trial extubation    Final Airway Details  Final airway type: endotracheal airway      Successful airway: ETT  Cuffed: yes   Successful intubation technique: direct laryngoscopy  Facilitating devices/methods: intubating stylet  Endotracheal tube insertion site: oral  Blade: Linda  Blade size: #4  ETT size (mm): 7.0  Cormack-Lehane Classification: grade I - full view of glottis  Placement verified by: chest auscultation and capnometry   Measured from: teeth  ETT to teeth (cm): 22  Number of attempts at approach: 1  Ventilation between attempts: none  Number of other approaches attempted: 0

## 2023-12-26 NOTE — ED PROVIDER NOTES
HPI   Chief Complaint   Patient presents with    Abdominal Pain     RLQ pain started 1 hour ago. Worried of an ectopic pregnancy. History of ectopic pregnancy.        HPI                    No data recorded                Patient History   Past Medical History:   Diagnosis Date    Body mass index (BMI) 45.0-49.9, adult (CMS/[facility]) 10/15/2018    BMI 45.0-49.9, adult    Migraine without aura, not intractable, without status migrainosus     Migraine without aura and without status migrainosus, not intractable    Morbid (severe) obesity due to excess calories (CMS/[facility]) 10/15/2018    Class 3 severe obesity due to excess calories without serious comorbidity with body mass index (BMI) of 45.0 to 49.9 in adult    Morbid (severe) obesity due to excess calories (CMS/[facility]) 02/19/2020    Class 3 severe obesity due to excess calories without serious comorbidity with body mass index (BMI) of 40.0 to 44.9 in adult    Morbid (severe) obesity due to excess calories (CMS/[facility]) 12/10/2018    Class 3 severe obesity due to excess calories without serious comorbidity with body mass index (BMI) of 40.0 to 44.9 in adult    Nicotine dependence, cigarettes, uncomplicated     Cigarette nicotine dependence without complication    Other constipation 03/23/2020    Chronic constipation    Other obesity due to excess calories 02/01/2019    Class 2 obesity due to excess calories without serious comorbidity with body mass index (BMI) of 39.0 to 39.9 in adult    Other specified depressive episodes     Other depression    Personal history of other diseases of the respiratory system     History of asthma    Personal history of other endocrine, nutritional and metabolic disease     History of obesity     Past Surgical History:   Procedure Laterality Date    ECTOPIC PREGNANCY SURGERY      KIDNEY SURGERY      TONSILLECTOMY  03/09/2018    Tonsillectomy     No family history on file.  Social History     Tobacco Use    Smoking status: Every Day     Types:  Cigarettes    Smokeless tobacco: Never   Substance Use Topics    Alcohol use: Yes     Comment: socially    Drug use: Never       Physical Exam   ED Triage Vitals [12/26/23 0736]   Temp Heart Rate Resp BP   36.6 °C (97.8 °F) 93 26 (!) 129/92      SpO2 Temp Source Heart Rate Source Patient Position   100 % Temporal -- --      BP Location FiO2 (%)     -- --       Physical Exam  Vitals and nursing note reviewed.   Constitutional:       General: She is not in acute distress.     Appearance: She is well-developed.   HENT:      Head: Normocephalic and atraumatic.   Eyes:      Conjunctiva/sclera: Conjunctivae normal.   Cardiovascular:      Rate and Rhythm: Normal rate and regular rhythm.      Heart sounds: No murmur heard.  Pulmonary:      Effort: Pulmonary effort is normal. No respiratory distress.      Breath sounds: Normal breath sounds.   Abdominal:      Palpations: Abdomen is soft.      Tenderness: There is abdominal tenderness in the right lower quadrant.   Musculoskeletal:         General: No swelling.      Cervical back: Neck supple.   Skin:     General: Skin is warm and dry.      Capillary Refill: Capillary refill takes less than 2 seconds.   Neurological:      Mental Status: She is alert.   Psychiatric:         Mood and Affect: Mood normal.         ED Course & MDM   Diagnoses as of 12/26/23 1239   RLQ abdominal pain   Less than 8 weeks gestation of pregnancy       Medical Decision Making  28-year-old female presents to the ER with chief complaint of right lower quadrant.  Patient reports it was sudden onset.  Patient reports that she did at home test and said that she is pregnant.  Patient came to the ED.  Patient is unable to give me full details of having time she has been pregnant but but she does report to me that she has 1 child 1 miscarriage and 1 ectopic pregnancy.  Again patient reports that this was sudden onset this morning not gradual.  Patient denies any vaginal discharge or vaginal bleeding.  patient  came to ED for evaluation.  Patient given pain medication and fluids patient is under went and under ultrasound which shows that no IUP however there is some free fluid that in the differential as a possible ectopic pregnancy.  Patient was given IV fluids pain medication antinausea medication in the ED.  Patient is concerned that she is bleeding internally for this reason reassured the patient her vital signs she is actually hypertensive at 130 systolic and heart rate is 62 which does not suggest internal bleeding I explained this to the patient I explained to her that we will repeat the H&H which also came back stable.  Patient is well-appearing to give her IV fluids her white count did go up slightly to 12 we still do not have a urine and the patient is refusing a straight cath.  I will be cautious and empirically start her on antibiotics.  Discussed with the main campus at [facility] Dr. Krishnamurthy who accepted the patient to labor delivery for evaluation.  Did try to explain to the patient what the plan is.  Patient is a somewhat challenging to deal with.         Procedure  Procedures     Fei Nelson, DO  12/26/23 2152
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
## 2023-12-27 LAB — BACTERIA UR CULT: NO GROWTH

## 2023-12-27 NOTE — ANESTHESIA POSTPROCEDURE EVALUATION
Patient: Gricelda Graff    Procedure Summary       Date: 12/26/23 Room / Location: [facility] OR 17 / [facility] OR    Anesthesia Start: 1828 Anesthesia Stop: 1957    Procedure: Salpingectomy Laparoscopy (Right: Abdomen) Diagnosis:       Pregnancy of unknown anatomic location      Lower abdominal pain      Peritonitis (CMS/HCC)      (Pregnancy of unknown anatomic location [O36.80X0])      (Lower abdominal pain [R10.30])      (Peritonitis (CMS/HCC) [K65.9])    Surgeons: Sunni Stanford MD Responsible Provider: John Linares MD    Anesthesia Type: general ASA Status: 2 - Emergent            Anesthesia Type: general    Vitals Value Taken Time   /89 12/26/23 1952   Temp 36.2 12/26/23 1958   Pulse 75 12/26/23 1954   Resp 18 12/26/23 1954   SpO2 100 % 12/26/23 1954   Vitals shown include unvalidated device data.    Anesthesia Post Evaluation    Patient location during evaluation: PACU  Patient participation: complete - patient participated  Level of consciousness: awake and alert  Pain management: satisfactory to patient  Airway patency: patent  Cardiovascular status: blood pressure returned to baseline and acceptable  Respiratory status: acceptable and face mask  Hydration status: acceptable  Postoperative Nausea and Vomiting: none        No notable events documented.

## 2023-12-27 NOTE — SIGNIFICANT EVENT
Follow-up Phone Call Note:   Interview:  Care Type: Women's Health   Phone Number Called: [phone]   Call Outcome: not a working number   Date/Time Of Call: 12/27/23 @ 5738   Call Back Done By: care coordinator   Callback Complete:  Yes

## 2023-12-27 NOTE — DISCHARGE SUMMARY
Discharge Summary    Admission Date: 12/26/2023  Discharge Date: 12/26/23    Discharge Diagnosis  Pregnancy of unknown anatomic location    Hospital Course  Patient was admitted on 12/26 from [place] for dx lap in s/o PUL. She underwent a dx lap, right salpingectomy, and evacuation of hemoperitoneum for ruptured ectopic pregnancy. Her procedure was uncomplicated, see the operative report for full details. She was discharged home on POD#0. Declined contraception.    Pertinent Physical Exam At Time of Discharge  Physical exam:  General:  AAOx3, No acute distress  Cardiovascular: Warm and well perfused  Respiratory: Normal respiratory effort   Abdominal:  Soft, appropriately tender, 3 laparoscopic incisions covered with islands   Extremities: No edema, no calf tenderness   Skin: No rashes or lesions visualized      Discharge Meds     Your medication list        START taking these medications        Instructions Last Dose Given Next Dose Due   acetaminophen 500 mg tablet  Commonly known as: Tylenol      Take 2 tablets (1,000 mg) by mouth every 6 hours if needed for mild pain (1 - 3).       ibuprofen 600 mg tablet      Take 1 tablet (600 mg) by mouth every 6 hours if needed for moderate pain (4 - 6) for up to 20 doses.       ondansetron 4 mg tablet  Commonly known as: Zofran      Take 1 tablet (4 mg) by mouth every 6 hours if needed for nausea for up to 20 doses.       oxyCODONE 5 mg immediate release tablet  Commonly known as: Roxicodone      Take 1 tablet (5 mg) by mouth every 6 hours if needed for severe pain (7 - 10) for up to 12 doses.       sennosides-docusate sodium 8.6-50 mg tablet  Commonly known as: Ashli-Colace      Take 1 tablet by mouth 2 times a day.              CONTINUE taking these medications        Instructions Last Dose Given Next Dose Due   multivitamin tablet                     Where to Get Your Medications        These medications were sent to [id number] [address] AT [facility] [address]  [address], [facility] [address]      Phone: [phone]   acetaminophen 500 mg tablet  ibuprofen 600 mg tablet  ondansetron 4 mg tablet  oxyCODONE 5 mg immediate release tablet  sennosides-docusate sodium 8.6-50 mg tablet        Test Results Pending At Discharge  Pending Labs       Order Current Status    Surgical Pathology Exam Collected (12/26/23 1925)    ABO/Rh In process    Type And Screen In process    Urine culture In process            Outpatient Follow-Up  No future appointments.        Sunni Castillo MD

## 2023-12-27 NOTE — CARE PLAN
The patient's goals for the shift include      The clinical goals for the shift include pain control      Problem:  Loss  Goal: Appropriate  loss/grief for pt/family  Outcome: Progressing  Goal: Demonstrated coping  Outcome: Progressing  Goal: Spiritual support  Outcome: Progressing     Problem: Pain - Adult  Goal: Verbalizes/displays adequate comfort level or baseline comfort level  Outcome: Progressing     Problem: Safety - Adult  Goal: Free from fall injury  Outcome: Progressing     Problem: Discharge Planning  Goal: Discharge to home or other facility with appropriate resources  Outcome: Progressing

## 2023-12-27 NOTE — OP NOTE
Salpingectomy laparoscopy Operative Note     Date: 2023  OR Location: [facility] OR    Name: Gricelda Graff, : 1995, Age: 28 y.o., MRN: [id number], Sex: female    Diagnosis  Pre-op Diagnosis     * Pregnancy of unknown anatomic location [O36.80X0]     * Lower abdominal pain [R10.30]     * Peritonitis (CMS/HCC) [K65.9] Post-op Diagnosis     * Pregnancy of unknown anatomic location [O36.80X0]     * Lower abdominal pain [R10.30]     * Peritonitis (CMS/HCC) [K65.9]     Procedures  Salpingectomy laparoscopy  95869 - DE LAPAROSCOPY W/RMVL ADNEXAL STRUCTURES      Surgeons      * Sunni Stanford - Primary    Resident/Fellow/Other Assistant:  Surgeon(s) and Role:    Procedure Summary  Anesthesia: General  ASA: II  Anesthesia Staff: Anesthesiologist: John Linares MD  Anesthesia Resident: Chon Dave MD  Estimated Blood Loss: 100mL  Intra-op Medications:   Medication Name Total Dose   sodium chloride 0.9 % irrigation solution 1,000 mL   HYDROmorphone (Dilaudid) injection 0.2 mg 0.4 mg   ondansetron (Zofran) injection 4 mg 4 mg              Anesthesia Record               Intraprocedure I/O Totals       None           Specimen:   ID Type Source Tests Collected by Time   1 : RIGHT FALLOPIAN TUBE AND ECTOPIC PREGNANCY Tissue FALLOPIAN TUBE SALPINGECTOMY RIGHT SURGICAL PATHOLOGY EXAM Sunni Stanford MD 2023 192        Staff:   Circulator: Alecia Jones RN  Relief Circulator: Iggy Duran RN  Scrub Person: Chantel Fay RN; Sheela Obrien RN         Drains and/or Catheters:   Urethral Catheter Non-latex 16 Fr. (Active)       Indications: Gricelda Graff is an 28 y.o. female who is having surgery for Pregnancy of unknown anatomic location [O36.80X0]  Lower abdominal pain [R10.30]  Peritonitis (CMS/HCC) [K65.9]. Ectopic pregnancy    Findings  Roughly 100cc of clot in cul-de-sac. Ruptured right tubal ectopic.     Description of Procedure  Patient was taken to the operating room where  she was prepared and draped in the usual sterile fashion.A Stern catheter was placed. Attention was then turned abdominally.  The base of the umbilicus was elevated using Kocher clamps.  The skin was incised with a scalpel.  The fascia was grasped with Kochers and entered sharply using a scalpel.  Peritoneum was bluntly opened using a Dawna clamp.  Intraperitoneal placement was confirmed via visualization of underlying bowel.     Alfonzo trocar was then placed at the umbilical entry site. Diagnostic laparoscopy was performed, and revealed findings as noted above.  No areas of trauma or injury were noted immediately inferior to the umbilicus. Complete abdominal survey was performed. The patient was then placed in steep Trendelenburg positioning.     Ancillary trocars were then placed under direct visualization. Three 5mm trocars were placed, one in the LLQ and one in the RLQ.    Attention was turned to the pelvis. Suction was used to remove all blood/clot from the pelvis. The right fallopian tube was elevated away from the underlying structures.  The mesosalpinx was clamped, sealed, and transected using the LigaSure device.  The fallopian tube was  to the level of the uterine cornua.  The right Fallopian tube and ectopic pregnancy were placed in a 5mm Endocatch bag and removed from the abdomen.      The pelvis was inspected and noted be adequately hemostatic under low pressure.       The umbilical fascia was then closed with 0 Vicryl suture.  The skin was closed with 4-0 Monocryl.        The Stern catheter was removed.      The patient was awakened from general anesthesia and taken the recovery room in stable condition.      I was present and scrubbed for the entirety of the surgical procedure.      Complications:  None; patient tolerated the procedure well.    Disposition: PACU - hemodynamically stable.  Condition: stable         Sunni Stanford  Phone Number: [phone]

## 2023-12-27 NOTE — CARE PLAN
The patient's goals for the shift include      The clinical goals for the shift include pain control    Problem:  Loss  Goal: Appropriate  loss/grief for pt/family  2023 by Romi Tineo RN  Outcome: Adequate for Discharge  2023 by Romi Tineo RN  Outcome: Progressing  Goal: Demonstrated coping  2023 by Romi Tineo RN  Outcome: Adequate for Discharge  2023 by Romi Tineo RN  Outcome: Progressing  Goal: Spiritual support  2023 by Romi Tineo RN  Outcome: Adequate for Discharge  2023 by Romi Tineo RN  Outcome: Progressing     Problem: Pain - Adult  Goal: Verbalizes/displays adequate comfort level or baseline comfort level  2023 by Romi Tineo RN  Outcome: Adequate for Discharge  2023 by Romi Tineo RN  Outcome: Progressing     Problem: Safety - Adult  Goal: Free from fall injury  2023 by Romi Tineo RN  Outcome: Adequate for Discharge  2023 by Romi Tineo RN  Outcome: Progressing     Problem: Discharge Planning  Goal: Discharge to home or other facility with appropriate resources  2023 by Romi Tineo RN  Outcome: Adequate for Discharge  2023 by Romi Tineo RN  Outcome: Progressing

## 2023-12-27 NOTE — ANESTHESIA PROCEDURE NOTES
Peripheral IV  Date/Time: 12/26/2023 7:00 PM      Placement  Needle size: 18 G  Laterality: right  Location: hand  Site prep: alcohol  Attempts: 1

## 2023-12-28 LAB
LABORATORY COMMENT REPORT: NORMAL
PATH REPORT.FINAL DX SPEC: NORMAL
PATH REPORT.GROSS SPEC: NORMAL
PATH REPORT.RELEVANT HX SPEC: NORMAL
PATH REPORT.TOTAL CANCER: NORMAL

## 2024-01-02 ENCOUNTER — APPOINTMENT (OUTPATIENT)
Dept: RADIOLOGY | Facility: HOSPITAL | Age: 29
End: 2024-01-02

## 2024-01-02 ENCOUNTER — HOSPITAL ENCOUNTER (EMERGENCY)
Facility: HOSPITAL | Age: 29
Discharge: HOME | End: 2024-01-02
Attending: STUDENT IN AN ORGANIZED HEALTH CARE EDUCATION/TRAINING PROGRAM

## 2024-01-02 VITALS
WEIGHT: 250 LBS | HEART RATE: 74 BPM | BODY MASS INDEX: 42.68 KG/M2 | SYSTOLIC BLOOD PRESSURE: 125 MMHG | RESPIRATION RATE: 16 BRPM | TEMPERATURE: 98.2 F | OXYGEN SATURATION: 95 % | HEIGHT: 64 IN | DIASTOLIC BLOOD PRESSURE: 76 MMHG

## 2024-01-02 DIAGNOSIS — G89.18 ACUTE POSTOPERATIVE ABDOMINAL PAIN: Primary | ICD-10-CM

## 2024-01-02 DIAGNOSIS — R10.9 ACUTE POSTOPERATIVE ABDOMINAL PAIN: Primary | ICD-10-CM

## 2024-01-02 LAB
ALBUMIN SERPL-MCNC: 3.5 G/DL (ref 3.5–5)
ALP BLD-CCNC: 64 U/L (ref 35–125)
ALT SERPL-CCNC: 9 U/L (ref 5–40)
ANION GAP SERPL CALC-SCNC: 6 MMOL/L
APPEARANCE UR: ABNORMAL
AST SERPL-CCNC: 11 U/L (ref 5–40)
BASOPHILS # BLD AUTO: 0.01 X10*3/UL (ref 0–0.1)
BASOPHILS NFR BLD AUTO: 0.1 %
BILIRUB SERPL-MCNC: 0.2 MG/DL (ref 0.1–1.2)
BILIRUB UR STRIP.AUTO-MCNC: NEGATIVE MG/DL
BUN SERPL-MCNC: 12 MG/DL (ref 8–25)
CALCIUM SERPL-MCNC: 8.6 MG/DL (ref 8.5–10.4)
CHLORIDE SERPL-SCNC: 104 MMOL/L (ref 97–107)
CO2 SERPL-SCNC: 26 MMOL/L (ref 24–31)
COLOR UR: ABNORMAL
CREAT SERPL-MCNC: 0.8 MG/DL (ref 0.4–1.6)
EOSINOPHIL # BLD AUTO: 0.07 X10*3/UL (ref 0–0.7)
EOSINOPHIL NFR BLD AUTO: 0.9 %
ERYTHROCYTE [DISTWIDTH] IN BLOOD BY AUTOMATED COUNT: 13.4 % (ref 11.5–14.5)
GFR SERPL CREATININE-BSD FRML MDRD: >90 ML/MIN/1.73M*2
GLUCOSE SERPL-MCNC: 104 MG/DL (ref 65–99)
GLUCOSE UR STRIP.AUTO-MCNC: NORMAL MG/DL
HCT VFR BLD AUTO: 34.8 % (ref 36–46)
HGB BLD-MCNC: 11.3 G/DL (ref 12–16)
IMM GRANULOCYTES # BLD AUTO: 0.05 X10*3/UL (ref 0–0.7)
IMM GRANULOCYTES NFR BLD AUTO: 0.6 % (ref 0–0.9)
KETONES UR STRIP.AUTO-MCNC: NEGATIVE MG/DL
LEUKOCYTE ESTERASE UR QL STRIP.AUTO: ABNORMAL
LIPASE SERPL-CCNC: 22 U/L (ref 16–63)
LYMPHOCYTES # BLD AUTO: 1.91 X10*3/UL (ref 1.2–4.8)
LYMPHOCYTES NFR BLD AUTO: 23.5 %
MAGNESIUM SERPL-MCNC: 2 MG/DL (ref 1.6–3.1)
MCH RBC QN AUTO: 28.5 PG (ref 26–34)
MCHC RBC AUTO-ENTMCNC: 32.5 G/DL (ref 32–36)
MCV RBC AUTO: 88 FL (ref 80–100)
MONOCYTES # BLD AUTO: 0.47 X10*3/UL (ref 0.1–1)
MONOCYTES NFR BLD AUTO: 5.8 %
MUCOUS THREADS #/AREA URNS AUTO: ABNORMAL /LPF
NEUTROPHILS # BLD AUTO: 5.63 X10*3/UL (ref 1.2–7.7)
NEUTROPHILS NFR BLD AUTO: 69.1 %
NITRITE UR QL STRIP.AUTO: NEGATIVE
NRBC BLD-RTO: 0 /100 WBCS (ref 0–0)
PH UR STRIP.AUTO: 7 [PH]
PLATELET # BLD AUTO: 259 X10*3/UL (ref 150–450)
POTASSIUM SERPL-SCNC: 3.9 MMOL/L (ref 3.4–5.1)
PROT SERPL-MCNC: 6.1 G/DL (ref 5.9–7.9)
PROT UR STRIP.AUTO-MCNC: NEGATIVE MG/DL
RBC # BLD AUTO: 3.96 X10*6/UL (ref 4–5.2)
RBC # UR STRIP.AUTO: ABNORMAL /UL
RBC #/AREA URNS AUTO: ABNORMAL /HPF
SODIUM SERPL-SCNC: 136 MMOL/L (ref 133–145)
SP GR UR STRIP.AUTO: 1.03
SQUAMOUS #/AREA URNS AUTO: ABNORMAL /HPF
UROBILINOGEN UR STRIP.AUTO-MCNC: NORMAL MG/DL
WBC # BLD AUTO: 8.1 X10*3/UL (ref 4.4–11.3)
WBC #/AREA URNS AUTO: ABNORMAL /HPF

## 2024-01-02 PROCEDURE — 2550000001 HC RX 255 CONTRASTS: Performed by: STUDENT IN AN ORGANIZED HEALTH CARE EDUCATION/TRAINING PROGRAM

## 2024-01-02 PROCEDURE — 96376 TX/PRO/DX INJ SAME DRUG ADON: CPT

## 2024-01-02 PROCEDURE — 85025 COMPLETE CBC W/AUTO DIFF WBC: CPT

## 2024-01-02 PROCEDURE — 36415 COLL VENOUS BLD VENIPUNCTURE: CPT

## 2024-01-02 PROCEDURE — 83690 ASSAY OF LIPASE: CPT

## 2024-01-02 PROCEDURE — 74177 CT ABD & PELVIS W/CONTRAST: CPT

## 2024-01-02 PROCEDURE — 80053 COMPREHEN METABOLIC PANEL: CPT

## 2024-01-02 PROCEDURE — 96374 THER/PROPH/DIAG INJ IV PUSH: CPT

## 2024-01-02 PROCEDURE — 2500000001 HC RX 250 WO HCPCS SELF ADMINISTERED DRUGS (ALT 637 FOR MEDICARE OP)

## 2024-01-02 PROCEDURE — 81001 URINALYSIS AUTO W/SCOPE: CPT

## 2024-01-02 PROCEDURE — 87086 URINE CULTURE/COLONY COUNT: CPT | Mod: TRILAB

## 2024-01-02 PROCEDURE — 2500000004 HC RX 250 GENERAL PHARMACY W/ HCPCS (ALT 636 FOR OP/ED)

## 2024-01-02 PROCEDURE — 83735 ASSAY OF MAGNESIUM: CPT

## 2024-01-02 PROCEDURE — 99284 EMERGENCY DEPT VISIT MOD MDM: CPT | Performed by: STUDENT IN AN ORGANIZED HEALTH CARE EDUCATION/TRAINING PROGRAM

## 2024-01-02 RX ORDER — DOXYCYCLINE 100 MG/1
100 CAPSULE ORAL 2 TIMES DAILY
Qty: 14 CAPSULE | Refills: 0 | Status: SHIPPED | OUTPATIENT
Start: 2024-01-02 | End: 2024-01-09

## 2024-01-02 RX ORDER — MORPHINE SULFATE 2 MG/ML
2 INJECTION, SOLUTION INTRAMUSCULAR; INTRAVENOUS ONCE
Status: COMPLETED | OUTPATIENT
Start: 2024-01-02 | End: 2024-01-02

## 2024-01-02 RX ORDER — OXYCODONE AND ACETAMINOPHEN 5; 325 MG/1; MG/1
1 TABLET ORAL ONCE
Status: COMPLETED | OUTPATIENT
Start: 2024-01-02 | End: 2024-01-02

## 2024-01-02 RX ORDER — MORPHINE SULFATE 4 MG/ML
4 INJECTION, SOLUTION INTRAMUSCULAR; INTRAVENOUS ONCE
Status: COMPLETED | OUTPATIENT
Start: 2024-01-02 | End: 2024-01-02

## 2024-01-02 RX ORDER — OXYCODONE AND ACETAMINOPHEN 5; 325 MG/1; MG/1
1 TABLET ORAL EVERY 8 HOURS PRN
Qty: 15 TABLET | Refills: 0 | Status: SHIPPED | OUTPATIENT
Start: 2024-01-02 | End: 2024-01-07

## 2024-01-02 RX ADMIN — MORPHINE SULFATE 2 MG: 2 INJECTION, SOLUTION INTRAMUSCULAR; INTRAVENOUS at 13:06

## 2024-01-02 RX ADMIN — IOHEXOL 75 ML: 350 INJECTION, SOLUTION INTRAVENOUS at 14:11

## 2024-01-02 RX ADMIN — MORPHINE SULFATE 4 MG: 4 INJECTION, SOLUTION INTRAMUSCULAR; INTRAVENOUS at 15:49

## 2024-01-02 RX ADMIN — OXYCODONE HYDROCHLORIDE AND ACETAMINOPHEN 1 TABLET: 5; 325 TABLET ORAL at 16:20

## 2024-01-02 ASSESSMENT — PAIN SCALES - GENERAL
PAINLEVEL_OUTOF10: 7
PAINLEVEL_OUTOF10: 5 - MODERATE PAIN

## 2024-01-02 ASSESSMENT — COLUMBIA-SUICIDE SEVERITY RATING SCALE - C-SSRS
6. HAVE YOU EVER DONE ANYTHING, STARTED TO DO ANYTHING, OR PREPARED TO DO ANYTHING TO END YOUR LIFE?: NO
1. IN THE PAST MONTH, HAVE YOU WISHED YOU WERE DEAD OR WISHED YOU COULD GO TO SLEEP AND NOT WAKE UP?: NO
2. HAVE YOU ACTUALLY HAD ANY THOUGHTS OF KILLING YOURSELF?: NO

## 2024-01-02 ASSESSMENT — PAIN - FUNCTIONAL ASSESSMENT
PAIN_FUNCTIONAL_ASSESSMENT: 0-10

## 2024-01-02 ASSESSMENT — PAIN DESCRIPTION - LOCATION
LOCATION: ABDOMEN
LOCATION: ABDOMEN

## 2024-01-02 ASSESSMENT — PAIN DESCRIPTION - PAIN TYPE: TYPE: SURGICAL PAIN

## 2024-01-02 NOTE — DISCHARGE INSTRUCTIONS
Be sure to take all medications, over the counter medications or prescription medications only as directed.     Be sure to follow up as directed in 1-2 days.  All of the details of your follow up instructions are detailed in the follow up section of this packet.      If you are being discharged with any pains medications or muscle relaxers (norco, Vicodin, hydrocodone products, Percocet, oxycodone products, flexeril, cyclobenzaprine, robaxin, norflex, brand or generic, or any other pain controlling medications with the exception of Ibuprofen and regular Tylenol, do not drive or operate machinery, climb ladders or participate in any activity that could potentially put yourself or others at risk should you get dizzy, or be/feel impaired at all.        It is important to remember that your care does not end here and you must continue to monitor your condition closely. Please return to the emergency department for any worsening or concerning signs or symptoms as directed by our conversations and the discharge instructions. Otherwise please follow up with your doctor in 2 days if no better or worse. If you do not have a doctor please contact the referral number on your discharge instructions. Please contact any physician specialists provided in your discharge notes as it is very important to follow up with them regarding your condition. If you are unable to reach the physicians provided, please come back to the Emergency Department at any time.       As always, please take medications as directed. If you have any questions at all regarding your medications, please contact the pharmacist, the emergency department, or your doctor. Before taking any medication prescribed in the Emergency Department, please review the medication side effects and drug interactions (<http://www.rxlist.com/script/main/hp.asp>) as they may interact with your home medications.     Having trouble affording medications? Try [employer]  <http://[email or web address]/>! (This is not a hospital endorsed website, merely a recommendation based on my own personal experiences with [facility])      Return to emergency room without delay for ANY new or worsening pains or for any other symptoms or concerns.      Return with worsening pains, nausea, vomiting, trouble breathing, palpitations, shortness of breath, inability to pass stool or urine, loss of control of stool or urine, any numbness or tingling (that is not normal for you), uncontrolled fevers, the passing of blood or other material in stool or urine, rashes, pains or for any other symptoms or concerns you may have.  You are always welcome to return to the ER at any time for any reason or for any other concerns you may have.    Call to make an appointment with PCP or specialist listed to be seen in 1-2 days for further evaluation. Or return here to the ER with any new or added concerns at any time.

## 2024-01-02 NOTE — ED PROVIDER NOTES
"HPI   Chief Complaint   Patient presents with    Abdominal Pain     Pt states \"I had an ectopic pregnancy and I had surgery recently on my ovaries and after I started getting some pain again and I noticed that fat was hanging out of my belly button where my incision was and then I went back to the hospital and they sutured that up and now I am having horrible pain again and I have this yellow fluid coming out of my vagina that looks just like the fluid coming out of my incision on my abdomen.\" Pt denies CP/SOB/N/V/fever/chills       Patient is a 28-year-old female presenting to the emergency department for evaluation of abdominal pain.  Patient is postop day 6 from Salpingectomy Laparoscopy due to ruptured ectopic pregnancy. Patient states after the surgery she thought she tore something and therefore went back to the emergency department. She states she had abdominal contents coming from her umbilical incision and they \"shoved the contents back in and closed the incision\". Patient states she is concerned about internal bleeding. She was not given any medications for pain control and states she has not taken any antibiotics. She denies chest pain, shortness of breath, nausea, vomiting, diarrhea, constipation, recent travel, recent illness, cough, congestion, headaches.                          Sharon Coma Scale Score: 15                  Patient History   Past Medical History:   Diagnosis Date    Asthma     Body mass index (BMI) 45.0-49.9, adult (CMS/[facility]) 10/15/2018    BMI 45.0-49.9, adult    Kidney stone     Migraine without aura, not intractable, without status migrainosus     Migraine without aura and without status migrainosus, not intractable    Nicotine dependence, cigarettes, uncomplicated     Cigarette nicotine dependence without complication    PCOS (polycystic ovarian syndrome)      Past Surgical History:   Procedure Laterality Date    ECTOPIC PREGNANCY SURGERY      KIDNEY SURGERY      TONSILLECTOMY  " 03/09/2018    Tonsillectomy     No family history on file.  Social History     Tobacco Use    Smoking status: Some Days     Types: Cigarettes    Smokeless tobacco: Never   Vaping Use    Vaping Use: Every day   Substance Use Topics    Alcohol use: Yes     Comment: socially    Drug use: Never       Physical Exam   ED Triage Vitals [01/02/24 1157]   Temp Heart Rate Resp BP   36.8 °C (98.2 °F) 80 18 98/77      SpO2 Temp Source Heart Rate Source Patient Position   100 % Oral Monitor Sitting      BP Location FiO2 (%)     Left arm --       Physical Exam  Vitals and nursing note reviewed.   Constitutional:       Appearance: She is well-developed.   HENT:      Head: Normocephalic and atraumatic.      Mouth/Throat:      Mouth: Mucous membranes are moist.   Eyes:      Extraocular Movements: Extraocular movements intact.      Pupils: Pupils are equal, round, and reactive to light.   Cardiovascular:      Rate and Rhythm: Normal rate and regular rhythm.      Heart sounds: Normal heart sounds. No murmur heard.     No friction rub. No gallop.   Pulmonary:      Effort: Pulmonary effort is normal.      Breath sounds: Normal breath sounds. No wheezing, rhonchi or rales.   Abdominal:      General: A surgical scar is present.      Palpations: Abdomen is soft.      Tenderness: There is generalized abdominal tenderness. There is no guarding or rebound.      Comments: Surgical incisions look to be healing appropriately.  Patient has periumbilical ecchymosis.  Umbilical incision appears to be healing well.  Both lower quadrant incisions appear to be healing well with no erythema, drainage or warmth.   Skin:     General: Skin is warm and dry.      Findings: No erythema.   Neurological:      General: No focal deficit present.      Mental Status: She is alert and oriented to person, place, and time.   Psychiatric:         Mood and Affect: Mood normal.         Behavior: Behavior normal.         ED Course & MDM   Diagnoses as of 01/02/24 2268    Acute postoperative abdominal pain       Medical Decision Making  Parts of this chart have been completed using voice recognition software. Please excuse any errors of transcription. Despite the medical decision making time stamp above-my medical decision making has taken place during the patient's entire visit. My thought process and reason for plan has been formulated from the time that I saw the patient until the time of disposition and is not specific to one specific moment during their visit and furthermore my MDM encompasses this entire chart and not only this text box.    Patient seen in conjunction with attending physician .    HPI: Detailed above.    Exam: A medically appropriate exam performed, outlined above, given the known history and presentation.    History obtained from: Patient    Social Determinants of Health considered during this visit: Lives at home    Labs/Diagnostics:  Labs Reviewed   CBC WITH AUTO DIFFERENTIAL - Abnormal       Result Value    WBC 8.1      nRBC 0.0      RBC 3.96 (*)     Hemoglobin 11.3 (*)     Hematocrit 34.8 (*)     MCV 88      MCH 28.5      MCHC 32.5      RDW 13.4      Platelets 259      Neutrophils % 69.1      Immature Granulocytes %, Automated 0.6      Lymphocytes % 23.5      Monocytes % 5.8      Eosinophils % 0.9      Basophils % 0.1      Neutrophils Absolute 5.63      Immature Granulocytes Absolute, Automated 0.05      Lymphocytes Absolute 1.91      Monocytes Absolute 0.47      Eosinophils Absolute 0.07      Basophils Absolute 0.01     COMPREHENSIVE METABOLIC PANEL - Abnormal    Glucose 104 (*)     Sodium 136      Potassium 3.9      Chloride 104      Bicarbonate 26      Urea Nitrogen 12      Creatinine 0.80      eGFR >90      Calcium 8.6      Albumin 3.5      Alkaline Phosphatase 64      Total Protein 6.1      AST 11      Bilirubin, Total 0.2      ALT 9      Anion Gap 6     URINALYSIS WITH REFLEX CULTURE AND MICROSCOPIC - Abnormal    Color, Urine  Light-Yellow      Appearance, Urine Turbid (*)     Specific Gravity, Urine 1.030      pH, Urine 7.0      Protein, Urine NEGATIVE      Glucose, Urine Normal      Blood, Urine 1.0 (3+) (*)     Ketones, Urine NEGATIVE      Bilirubin, Urine NEGATIVE      Urobilinogen, Urine Normal      Nitrite, Urine NEGATIVE      Leukocyte Esterase, Urine 250 Ceasar/µL (*)    MICROSCOPIC ONLY, URINE - Abnormal    WBC, Urine 11-20 (*)     RBC, Urine 11-20 (*)     Squamous Epithelial Cells, Urine 1-9 (SPARSE)      Mucus, Urine FEW     MAGNESIUM - Normal    Magnesium 2.00     LIPASE - Normal    Lipase 22     URINE CULTURE   URINALYSIS WITH REFLEX CULTURE AND MICROSCOPIC    Narrative:     The following orders were created for panel order Urinalysis with Reflex Culture and Microscopic.  Procedure                               Abnormality         Status                     ---------                               -----------         ------                     Urinalysis with Reflex C...[[id number]]  Abnormal            Final result               Extra Urine Gray Tube[[id number]]                                                         Please view results for these tests on the individual orders.   EXTRA URINE GRAY TUBE     CT abdomen pelvis w IV contrast   Final Result   Fat containing periumbilical hernia with a fairly wide neck. There is   mild surrounding inflammatory change possibly due to recent surgery.   No significant collection nor definitive abscess is seen.        Otherwise unremarkable examination with no evidence for acute   pathology within the abdomen or pelvis.        Signed by: Pedro Hale 1/2/2024 2:58 PM   Dictation workstation:   [id number]        Medications given during visit:Morphine and percocet    Considerations/further MDM:  Patient is a 28-year-old female presenting to the emergency department for evaluation abdominal pain.  She is 6 days postop from laparoscopic salpingectomy due to ruptured ectopic pregnancy.  On  physical exam vital signs stable patient is in no acute distress.  She is tender to palpation in the lower abdomen with no rebound or guarding.  She has periumbilical ecchymosis however incision sites appear to be healing well.  Diagnostic labs and imaging ordered.  CT of the abdomen and pelvis showed fat-containing umbilical hernia with surrounding inflammatory changes due to recent surgery with no abscess seen.  CBC showed no evidence of leukocytosis with a hemoglobin of 11.3 and hematocrit 34.8 likely due to patient's recent surgery.  Lipase normal.  Magnesium normal.  CMP showed no electrolyte abnormalities.  Urine showed some evidence of infection which will be covered by her Keflex prescription.  Patient has been prescribed Keflex and Doxy was added as well as Percocet for pain management.  Patient has been advised to follow-up with primary care doctor as well as surgeon outpatient.  Patient will return to the emergency department with any new or worsening symptoms.  She was discharged in stable condition with pain well-controlled.    I estimate there is low risk for acute abdomen. I have considered the following: acute appendicitis, bowel obstruction, cholecystitis, diverticulitis, incarcerated hernia, mesenteric ischemia, pancreatitis, acute liver failure, renal failure, UTI/Pyelonephritis to an extent that requires admission and IV abx, perforated bowel, or ulcers.    Thus I consider the discharge disposition reasonable. Also, there is no evidence of peritonitis, sepsis, or toxicity. We have discussed the diagnosis and risks, and we agree with discharging home to follow-up with an appropriate physician as directed. We also discussed returning to the Emergency Department immediately if new or worsening symptoms occur. We have discussed the symptoms which are most concerning (e.g., bloody stool, fever, changing or worsening pain, nausea, vomiting) that necessitate immediate return. Patient symptoms have been  well controlled in the ED. The patient is at a lower risk for acute/surgical abdomen and is safe for discharge with appropriate outpatient follow up. The patient has verbalized understanding to return to ED without delay for new or worsening pains or for any other symptoms or concerns.    Procedure  Procedures     vIone La PA-C  01/02/24 162

## 2024-01-03 LAB — BACTERIA UR CULT: NORMAL

## 2024-01-10 ENCOUNTER — OFFICE VISIT (OUTPATIENT)
Dept: OBSTETRICS AND GYNECOLOGY | Facility: CLINIC | Age: 29
End: 2024-01-10

## 2024-01-10 DIAGNOSIS — R35.0 URINARY FREQUENCY: ICD-10-CM

## 2024-01-10 DIAGNOSIS — K42.9 UMBILICAL HERNIA WITHOUT OBSTRUCTION AND WITHOUT GANGRENE: Primary | ICD-10-CM

## 2024-01-10 LAB
POC APPEARANCE, URINE: CLEAR
POC BILIRUBIN, URINE: NEGATIVE
POC BLOOD, URINE: ABNORMAL
POC COLOR, URINE: YELLOW
POC GLUCOSE, URINE: NEGATIVE MG/DL
POC KETONES, URINE: NEGATIVE MG/DL
POC LEUKOCYTES, URINE: ABNORMAL
POC NITRITE,URINE: NEGATIVE
POC PH, URINE: 6.5 PH
POC PROTEIN, URINE: NEGATIVE MG/DL
POC SPECIFIC GRAVITY, URINE: 1.01
POC UROBILINOGEN, URINE: 0.2 EU/DL

## 2024-01-10 PROCEDURE — 99214 OFFICE O/P EST MOD 30 MIN: CPT | Mod: GC | Performed by: OBSTETRICS & GYNECOLOGY

## 2024-01-10 PROCEDURE — 99214 OFFICE O/P EST MOD 30 MIN: CPT | Performed by: OBSTETRICS & GYNECOLOGY

## 2024-01-10 NOTE — PROGRESS NOTES
Gricelda Graff is a 29 y.o. female  2 weeks s/p diagnostic laparoscopy, right salpingectomy, and evacuation of hemoperitoneum on  due to ectopic pregnancy. Since the procedure, patient reports wound dehiscence in which she went to the emergency room at [facility] on  for repair. During her ED visit, she underwent CTAP notable for fat containing periumbilical hernia. She completed 7 day course of doxycyline and Keflex due to concern for cellulitis noted at the ED visit.      Objective     General: no acute distress  HEENT: normocephalic, atraumatic  Heart: warm and well perfused  Lungs: breathing comfortably on room air  Abdomen: soft, nontender to palpation, umbilical incision with 2 simple interrupted stiches, c/d/I after removal, no erythema, induration or drainage noted. Port site incison in the LLQ and RLQ clean, dry and intact  Extremities: moving all extremities  Neuro: awake and conversant  Psych: appropriate mood and affect    Assessment/Plan    29 y.o. female  2 weeks s/p diagnostic laparoscopy, right salpingectomy, and evacuation of hemoperitoneum on  due to ectopic pregnancy.    - Umbilical incision with 2 simple interrupted sutures, removed in office today, incision clean, dry, and intact  - Patient unsure of when interested in next pregnancy, would like to defer hormonal contraception at this time. Discussed starting prenatal vitamin  - CTAP on  notable for fat containing periumbilical hernia, patient reports intermittent nausea/vomiting however overall felling better since procedure. Last bowel movement yesterday. Referred to general surgery      Orders Placed This Encounter   Procedures    Referral to General Surgery     Standing Status:   Future     Standing Expiration Date:   7/10/2024     Referral Priority:   Routine     Referral Type:   Consultation     Referral Reason:   Specialty Services Required     Requested Specialty:   General Surgery     Number of Visits  Requested:   1    POCT UA Automated manually resulted     Order Specific Question:   Release result to Incont     Answer:   Immediate [1]     RTC as needed  Seen and discussed with Dr. Nirmal Kim MD PGY-2

## 2024-01-20 NOTE — PROGRESS NOTES
I saw and evaluated the patient. I personally obtained the key and critical portions of the history and physical exam or was physically present for key and critical portions performed by the resident/fellow. I reviewed the resident/fellow's documentation and discussed the patient with the resident/fellow. I agree with the resident/fellow's medical decision making as documented in the note.    Fransisca Kinney MD

## 2024-09-05 ENCOUNTER — HOSPITAL ENCOUNTER (EMERGENCY)
Facility: HOSPITAL | Age: 29
Discharge: LEFT BEFORE DISCHARGE | End: 2024-09-05
Attending: EMERGENCY MEDICINE

## 2024-09-05 ENCOUNTER — APPOINTMENT (OUTPATIENT)
Dept: RADIOLOGY | Facility: HOSPITAL | Age: 29
End: 2024-09-05

## 2024-09-05 VITALS
RESPIRATION RATE: 15 BRPM | HEIGHT: 64 IN | WEIGHT: 220 LBS | SYSTOLIC BLOOD PRESSURE: 125 MMHG | BODY MASS INDEX: 37.56 KG/M2 | HEART RATE: 72 BPM | TEMPERATURE: 97.2 F | DIASTOLIC BLOOD PRESSURE: 75 MMHG | OXYGEN SATURATION: 99 %

## 2024-09-05 DIAGNOSIS — N30.00 ACUTE CYSTITIS WITHOUT HEMATURIA: Primary | ICD-10-CM

## 2024-09-05 DIAGNOSIS — S39.012A LUMBAR STRAIN, INITIAL ENCOUNTER: ICD-10-CM

## 2024-09-05 LAB
ALBUMIN SERPL-MCNC: 4.1 G/DL (ref 3.5–5)
ALP BLD-CCNC: 63 U/L (ref 35–125)
ALT SERPL-CCNC: 10 U/L (ref 5–40)
ANION GAP SERPL CALC-SCNC: 8 MMOL/L
APPEARANCE UR: ABNORMAL
AST SERPL-CCNC: 12 U/L (ref 5–40)
BACTERIA #/AREA URNS AUTO: ABNORMAL /HPF
BASOPHILS # BLD AUTO: 0.02 X10*3/UL (ref 0–0.1)
BASOPHILS NFR BLD AUTO: 0.3 %
BILIRUB SERPL-MCNC: 0.5 MG/DL (ref 0.1–1.2)
BILIRUB UR STRIP.AUTO-MCNC: NEGATIVE MG/DL
BUN SERPL-MCNC: 13 MG/DL (ref 8–25)
CALCIUM SERPL-MCNC: 9.1 MG/DL (ref 8.5–10.4)
CHLORIDE SERPL-SCNC: 105 MMOL/L (ref 97–107)
CO2 SERPL-SCNC: 25 MMOL/L (ref 24–31)
COLOR UR: YELLOW
CREAT SERPL-MCNC: 0.9 MG/DL (ref 0.4–1.6)
CRP SERPL-MCNC: <0.3 MG/DL (ref 0–2)
EGFRCR SERPLBLD CKD-EPI 2021: 89 ML/MIN/1.73M*2
EOSINOPHIL # BLD AUTO: 0.04 X10*3/UL (ref 0–0.7)
EOSINOPHIL NFR BLD AUTO: 0.6 %
ERYTHROCYTE [DISTWIDTH] IN BLOOD BY AUTOMATED COUNT: 13.2 % (ref 11.5–14.5)
ERYTHROCYTE [SEDIMENTATION RATE] IN BLOOD BY WESTERGREN METHOD: 5 MM/H (ref 0–20)
GLUCOSE SERPL-MCNC: 104 MG/DL (ref 65–99)
GLUCOSE UR STRIP.AUTO-MCNC: NORMAL MG/DL
HCG SERPL-ACNC: <1 MIU/ML
HCT VFR BLD AUTO: 38.8 % (ref 36–46)
HGB BLD-MCNC: 12.7 G/DL (ref 12–16)
IMM GRANULOCYTES # BLD AUTO: 0.03 X10*3/UL (ref 0–0.7)
IMM GRANULOCYTES NFR BLD AUTO: 0.4 % (ref 0–0.9)
KETONES UR STRIP.AUTO-MCNC: NEGATIVE MG/DL
LEUKOCYTE ESTERASE UR QL STRIP.AUTO: ABNORMAL
LYMPHOCYTES # BLD AUTO: 1.36 X10*3/UL (ref 1.2–4.8)
LYMPHOCYTES NFR BLD AUTO: 19.1 %
MCH RBC QN AUTO: 27.7 PG (ref 26–34)
MCHC RBC AUTO-ENTMCNC: 32.7 G/DL (ref 32–36)
MCV RBC AUTO: 85 FL (ref 80–100)
MONOCYTES # BLD AUTO: 0.39 X10*3/UL (ref 0.1–1)
MONOCYTES NFR BLD AUTO: 5.5 %
MUCOUS THREADS #/AREA URNS AUTO: ABNORMAL /LPF
NEUTROPHILS # BLD AUTO: 5.27 X10*3/UL (ref 1.2–7.7)
NEUTROPHILS NFR BLD AUTO: 74.1 %
NITRITE UR QL STRIP.AUTO: NEGATIVE
NRBC BLD-RTO: 0 /100 WBCS (ref 0–0)
PH UR STRIP.AUTO: 6.5 [PH]
PLATELET # BLD AUTO: 240 X10*3/UL (ref 150–450)
POTASSIUM SERPL-SCNC: 4.4 MMOL/L (ref 3.4–5.1)
PROT SERPL-MCNC: 6.4 G/DL (ref 5.9–7.9)
PROT UR STRIP.AUTO-MCNC: ABNORMAL MG/DL
RBC # BLD AUTO: 4.59 X10*6/UL (ref 4–5.2)
RBC # UR STRIP.AUTO: NEGATIVE /UL
RBC #/AREA URNS AUTO: ABNORMAL /HPF
SODIUM SERPL-SCNC: 138 MMOL/L (ref 133–145)
SP GR UR STRIP.AUTO: 1.03
SQUAMOUS #/AREA URNS AUTO: ABNORMAL /HPF
UROBILINOGEN UR STRIP.AUTO-MCNC: NORMAL MG/DL
WBC # BLD AUTO: 7.1 X10*3/UL (ref 4.4–11.3)
WBC #/AREA URNS AUTO: ABNORMAL /HPF

## 2024-09-05 PROCEDURE — 2500000004 HC RX 250 GENERAL PHARMACY W/ HCPCS (ALT 636 FOR OP/ED): Performed by: EMERGENCY MEDICINE

## 2024-09-05 PROCEDURE — 84520 ASSAY OF UREA NITROGEN: CPT | Performed by: EMERGENCY MEDICINE

## 2024-09-05 PROCEDURE — 85025 COMPLETE CBC W/AUTO DIFF WBC: CPT | Performed by: EMERGENCY MEDICINE

## 2024-09-05 PROCEDURE — 2500000001 HC RX 250 WO HCPCS SELF ADMINISTERED DRUGS (ALT 637 FOR MEDICARE OP): Performed by: EMERGENCY MEDICINE

## 2024-09-05 PROCEDURE — 86140 C-REACTIVE PROTEIN: CPT | Performed by: CLINICAL NURSE SPECIALIST

## 2024-09-05 PROCEDURE — 36415 COLL VENOUS BLD VENIPUNCTURE: CPT | Performed by: EMERGENCY MEDICINE

## 2024-09-05 PROCEDURE — 85652 RBC SED RATE AUTOMATED: CPT | Performed by: CLINICAL NURSE SPECIALIST

## 2024-09-05 PROCEDURE — 84075 ASSAY ALKALINE PHOSPHATASE: CPT | Performed by: EMERGENCY MEDICINE

## 2024-09-05 PROCEDURE — 74176 CT ABD & PELVIS W/O CONTRAST: CPT

## 2024-09-05 PROCEDURE — 87086 URINE CULTURE/COLONY COUNT: CPT | Mod: TRILAB,WESLAB | Performed by: EMERGENCY MEDICINE

## 2024-09-05 PROCEDURE — 81001 URINALYSIS AUTO W/SCOPE: CPT | Performed by: EMERGENCY MEDICINE

## 2024-09-05 PROCEDURE — 96375 TX/PRO/DX INJ NEW DRUG ADDON: CPT

## 2024-09-05 PROCEDURE — 96372 THER/PROPH/DIAG INJ SC/IM: CPT | Performed by: EMERGENCY MEDICINE

## 2024-09-05 PROCEDURE — 2500000004 HC RX 250 GENERAL PHARMACY W/ HCPCS (ALT 636 FOR OP/ED): Performed by: CLINICAL NURSE SPECIALIST

## 2024-09-05 PROCEDURE — 99284 EMERGENCY DEPT VISIT MOD MDM: CPT | Mod: 25

## 2024-09-05 PROCEDURE — 2500000005 HC RX 250 GENERAL PHARMACY W/O HCPCS: Performed by: CLINICAL NURSE SPECIALIST

## 2024-09-05 PROCEDURE — 74176 CT ABD & PELVIS W/O CONTRAST: CPT | Performed by: RADIOLOGY

## 2024-09-05 PROCEDURE — 96365 THER/PROPH/DIAG IV INF INIT: CPT

## 2024-09-05 PROCEDURE — 84702 CHORIONIC GONADOTROPIN TEST: CPT | Performed by: CLINICAL NURSE SPECIALIST

## 2024-09-05 RX ORDER — CEPHALEXIN 500 MG/1
500 CAPSULE ORAL ONCE
Status: DISCONTINUED | OUTPATIENT
Start: 2024-09-05 | End: 2024-09-05

## 2024-09-05 RX ORDER — METHYLPREDNISOLONE 4 MG/1
TABLET ORAL
Qty: 21 TABLET | Refills: 0 | Status: SHIPPED | OUTPATIENT
Start: 2024-09-05 | End: 2024-09-12

## 2024-09-05 RX ORDER — CEPHALEXIN 500 MG/1
500 CAPSULE ORAL 3 TIMES DAILY
Qty: 21 CAPSULE | Refills: 0 | Status: SHIPPED | OUTPATIENT
Start: 2024-09-05 | End: 2024-09-12

## 2024-09-05 RX ORDER — KETOROLAC TROMETHAMINE 30 MG/ML
15 INJECTION, SOLUTION INTRAMUSCULAR; INTRAVENOUS ONCE
Status: COMPLETED | OUTPATIENT
Start: 2024-09-05 | End: 2024-09-05

## 2024-09-05 RX ORDER — LIDOCAINE 560 MG/1
1 PATCH PERCUTANEOUS; TOPICAL; TRANSDERMAL DAILY
Status: DISCONTINUED | OUTPATIENT
Start: 2024-09-05 | End: 2024-09-05 | Stop reason: HOSPADM

## 2024-09-05 RX ORDER — TRAMADOL HYDROCHLORIDE 50 MG/1
50 TABLET ORAL ONCE
Status: COMPLETED | OUTPATIENT
Start: 2024-09-05 | End: 2024-09-05

## 2024-09-05 RX ORDER — ACETAMINOPHEN 325 MG/1
975 TABLET ORAL ONCE
Status: COMPLETED | OUTPATIENT
Start: 2024-09-05 | End: 2024-09-05

## 2024-09-05 RX ORDER — DEXAMETHASONE SODIUM PHOSPHATE 10 MG/ML
10 INJECTION INTRAMUSCULAR; INTRAVENOUS ONCE
Status: COMPLETED | OUTPATIENT
Start: 2024-09-05 | End: 2024-09-05

## 2024-09-05 RX ORDER — CYCLOBENZAPRINE HCL 10 MG
10 TABLET ORAL 2 TIMES DAILY PRN
Qty: 10 TABLET | Refills: 0 | Status: SHIPPED | OUTPATIENT
Start: 2024-09-05 | End: 2024-09-10

## 2024-09-05 RX ORDER — METHOCARBAMOL 100 MG/ML
1000 INJECTION, SOLUTION INTRAMUSCULAR; INTRAVENOUS ONCE
Status: COMPLETED | OUTPATIENT
Start: 2024-09-05 | End: 2024-09-05

## 2024-09-05 RX ORDER — CEFTRIAXONE 1 G/50ML
1 INJECTION, SOLUTION INTRAVENOUS ONCE
Status: COMPLETED | OUTPATIENT
Start: 2024-09-05 | End: 2024-09-05

## 2024-09-05 RX ADMIN — DEXAMETHASONE SODIUM PHOSPHATE 10 MG: 10 INJECTION INTRAMUSCULAR; INTRAVENOUS at 14:57

## 2024-09-05 RX ADMIN — CEFTRIAXONE SODIUM 1 G: 1 INJECTION, SOLUTION INTRAVENOUS at 14:28

## 2024-09-05 RX ADMIN — METHOCARBAMOL 1000 MG: 100 INJECTION, SOLUTION INTRAMUSCULAR; INTRAVENOUS at 11:05

## 2024-09-05 RX ADMIN — SODIUM CHLORIDE 1000 ML: 900 INJECTION, SOLUTION INTRAVENOUS at 14:28

## 2024-09-05 RX ADMIN — KETOROLAC TROMETHAMINE 15 MG: 30 INJECTION, SOLUTION INTRAMUSCULAR at 11:05

## 2024-09-05 RX ADMIN — LIDOCAINE 1 PATCH: 4 PATCH TOPICAL at 14:28

## 2024-09-05 RX ADMIN — TRAMADOL HYDROCHLORIDE 50 MG: 50 TABLET ORAL at 14:57

## 2024-09-05 RX ADMIN — ACETAMINOPHEN 975 MG: 325 TABLET ORAL at 11:05

## 2024-09-05 ASSESSMENT — PAIN SCALES - GENERAL
PAINLEVEL_OUTOF10: 7
PAINLEVEL_OUTOF10: 5 - MODERATE PAIN
PAINLEVEL_OUTOF10: 6

## 2024-09-05 ASSESSMENT — COLUMBIA-SUICIDE SEVERITY RATING SCALE - C-SSRS
2. HAVE YOU ACTUALLY HAD ANY THOUGHTS OF KILLING YOURSELF?: NO
6. HAVE YOU EVER DONE ANYTHING, STARTED TO DO ANYTHING, OR PREPARED TO DO ANYTHING TO END YOUR LIFE?: NO
1. IN THE PAST MONTH, HAVE YOU WISHED YOU WERE DEAD OR WISHED YOU COULD GO TO SLEEP AND NOT WAKE UP?: NO

## 2024-09-05 ASSESSMENT — PAIN - FUNCTIONAL ASSESSMENT: PAIN_FUNCTIONAL_ASSESSMENT: 0-10

## 2024-09-05 NOTE — ED PROVIDER NOTES
Department of Emergency Medicine   ED  Provider Note  Admit Date/RoomTime: 9/5/2024 10:26 AM  ED Room: AC15/AC15        History of Present Illness:  Chief Complaint   Patient presents with    Back Pain     Pt states she twisted her back and has been having severe lower back pain that radiates down her left leg.          Gricelda Graff is a 29 y.o. female history of chronic back problems scoliosis history of kidney stones presents to the emergency department complaints of back pain onset of symptoms yesterday.  Patient states she rolled over in bed and had a back spasm was having trouble getting out of bed.  Radiates to the right leg.  When she got to up to go to the bathroom she got stuck on the commode was having difficulty getting up off the commode today.  She denies any history of IV drug use.  No fever no chills no abdominal pain no nausea no vomiting.  No loss of bowel or bladder control.  No numbness or tingling to the groin.  Reports if she lifts her right leg causes spasming in her back any type of movement or standing causes spasm in her back.  Reports in the past when she has episodes like this she uses a back brace and muscle relaxer.  She currently does not follow with a doctor for chronic back pain recently moved here a year ago and does not have a primary care physician.    Review of Systems:   Pertinent positives and negatives are stated within HPI, all other systems reviewed and are negative.        --------------------------------------------- PAST HISTORY ---------------------------------------------  Past Medical History:  has a past medical history of Asthma ([facility]), Body mass index (BMI) 45.0-49.9, adult (Multi) (10/15/2018), Kidney stone, Migraine without aura, not intractable, without status migrainosus, Nicotine dependence, cigarettes, uncomplicated, and PCOS (polycystic ovarian syndrome).  Past Surgical History:  has a past surgical history that includes Tonsillectomy (03/09/2018);  Kidney surgery; and Ectopic pregnancy surgery.  Social History:  reports that she has been smoking cigarettes. She has never used smokeless tobacco. She reports current alcohol use. She reports that she does not use drugs.  Family History: family history is not on file.. Unless otherwise noted, family history is non contributory  The patient’s home medications have been reviewed.  Allergies: Patient has no known allergies.        ---------------------------------------------------PHYSICAL EXAM--------------------------------------    GENERAL APPEARANCE: Awake and alert.   VITAL SIGNS: As per the nurses' triage record.   HEENT: Normocephalic, atraumatic. Extraocular muscles are intact. Pupils equal round and reactive to light. Conjunctiva are pink. Negative scleral icterus. Mucous membranes are moist. Tongue in the midline. Pharynx was without erythema or exudates, uvula midline  NECK: Soft Nontender and supple, full gross ROM, no meningeal signs.  CHEST: Nontender to palpation. Clear to auscultation bilaterally. No rales, rhonchi, or wheezing.   HEART: S1, S2. Regular rate and rhythm. No murmurs, gallops or rubs.  Strong and equal pulses in the extremities.   ABDOMEN: Soft, nontender, nondistended, positive bowel sounds, no palpable masses.  Back: No pain palpation on the thoracic or lumbar spine no step-offs crepitus or bruising no CVA tenderness no saddle paresthesias noted.  Tenderness to palpation to the left flank area no bruising no saddle paresthesias pain with lifting the right leg.  Peripheral pulses are intact.  No peripheral edema  MUSCULCSKELETAL: The calves are nontender to palpation. Full gross active range of motion.   NEUROLOGICAL: Awake, alert and oriented x 3. Power intact in the upper and lower extremities. Sensation is intact to light touch in the upper and lower extremities.   IMMUNOLOGICAL: No lymphatic streaking noted   DERM: No petechiae, rashes, or  "ecchymoses.          ------------------------- NURSING NOTES AND VITALS REVIEWED ---------------------------  The nursing notes within the ED encounter and vital signs as below have been reviewed by myself  /75   Pulse 72   Temp 36.2 °C (97.2 °F) (Temporal)   Resp 15   Ht 1.626 m (5' 4\")   Wt 99.8 kg (220 lb)   SpO2 99%   Breastfeeding Unknown   BMI 37.76 kg/m²     Oxygen Saturation Interpretation: 94% room air  The patient’s available past medical records and past encounters were reviewed.          -----------------------DIAGNOSTIC RESULTS------------------------  LABS:    Labs Reviewed   COMPREHENSIVE METABOLIC PANEL - Abnormal       Result Value    Glucose 104 (*)     Sodium 138      Potassium 4.4      Chloride 105      Bicarbonate 25      Urea Nitrogen 13      Creatinine 0.90      eGFR 89      Calcium 9.1      Albumin 4.1      Alkaline Phosphatase 63      Total Protein 6.4      AST 12      Bilirubin, Total 0.5      ALT 10      Anion Gap 8     URINALYSIS WITH REFLEX CULTURE AND MICROSCOPIC - Abnormal    Color, Urine Yellow      Appearance, Urine Turbid (*)     Specific Gravity, Urine 1.035      pH, Urine 6.5      Protein, Urine 30 (1+) (*)     Glucose, Urine Normal      Blood, Urine NEGATIVE      Ketones, Urine NEGATIVE      Bilirubin, Urine NEGATIVE      Urobilinogen, Urine Normal      Nitrite, Urine NEGATIVE      Leukocyte Esterase, Urine 500 Ceasar/µL (*)    MICROSCOPIC ONLY, URINE - Abnormal    WBC, Urine 1-5      RBC, Urine 3-5      Squamous Epithelial Cells, Urine 26-50 (1+)      Bacteria, Urine 1+ (*)     Mucus, Urine FEW     SEDIMENTATION RATE, AUTOMATED - Normal    Sedimentation Rate 5     C-REACTIVE PROTEIN - Normal    C-Reactive Protein <0.30     URINE CULTURE   CBC WITH AUTO DIFFERENTIAL    WBC 7.1      nRBC 0.0      RBC 4.59      Hemoglobin 12.7      Hematocrit 38.8      MCV 85      MCH 27.7      MCHC 32.7      RDW 13.2      Platelets 240      Neutrophils % 74.1      Immature Granulocytes " %, Automated 0.4      Lymphocytes % 19.1      Monocytes % 5.5      Eosinophils % 0.6      Basophils % 0.3      Neutrophils Absolute 5.27      Immature Granulocytes Absolute, Automated 0.03      Lymphocytes Absolute 1.36      Monocytes Absolute 0.39      Eosinophils Absolute 0.04      Basophils Absolute 0.02     HUMAN CHORIONIC GONADOTROPIN, SERUM QUANTITATIVE    HCG, Beta-Quantitative <1     URINALYSIS WITH REFLEX CULTURE AND MICROSCOPIC    Narrative:     The following orders were created for panel order Urinalysis with Reflex Culture and Microscopic.  Procedure                               Abnormality         Status                     ---------                               -----------         ------                     Urinalysis with Reflex C...[[id number]]  Abnormal            Final result               Extra Urine Gray Tube[[id number]]                                                         Please view results for these tests on the individual orders.   EXTRA URINE GRAY TUBE       As interpreted by me, the above displayed labs are abnormal. All other labs obtained during this visit were within normal range or not returned as of this dictation.        CT abdomen pelvis wo IV contrast   Final Result   No renal or ureteral calculi. No hydroureteronephrosis bilaterally.        No acute intra-abdominal process.        Normal appendix. No bowel obstruction.        Small fat containing periumbilical hernia without inflammation.        Bilateral L5 spondylolysis with minimal anterolisthesis of L5   relative to S1. Disc space narrowing and endplate sclerosis of L5-S1.        MACRO:   None.        Signed by: Jose Luis Vazquez 9/5/2024 12:51 PM   Dictation workstation:   [id number]              CT abdomen pelvis wo IV contrast   Final Result   No renal or ureteral calculi. No hydroureteronephrosis bilaterally.        No acute intra-abdominal process.        Normal appendix. No bowel obstruction.        Small fat  containing periumbilical hernia without inflammation.        Bilateral L5 spondylolysis with minimal anterolisthesis of L5   relative to S1. Disc space narrowing and endplate sclerosis of L5-S1.        MACRO:   None.        Signed by: Jose Luis Vazquez 9/5/2024 12:51 PM   Dictation workstation:   [id number]              ------------------------------ ED COURSE/MEDICAL DECISION MAKING----------------------  Medical Decision Making:   Exam: A medically appropriate exam performed, outlined above, given the known history and presentation.    History obtained from: EMS report, patient nursing report medical record      Social Determinants of Health considered during this visit:  Takes care of self at home      PAST MEDICAL HISTORY/Chronic Conditions Affecting Care     has a past medical history of Asthma ([facility]), Body mass index (BMI) 45.0-49.9, adult (Multi) (10/15/2018), Kidney stone, Migraine without aura, not intractable, without status migrainosus, Nicotine dependence, cigarettes, uncomplicated, and PCOS (polycystic ovarian syndrome).       CC/HPI Summary, Social Determinants of health, Records Reviewed, DDx, testing done/not done, ED Course, Reassessment, disposition considerations/shared decision making with patient, consults, disposition:   Presents with back pain history of the same no fall or injury no history of IV drug use no fever no loss of bowel or bladder control   Plan  Tylenol  Toradol  CBC  CMP  Pregnancy  Urine  CT abdomen pelvis No renal or ureteral calculi. No hydroureteronephrosis bilaterally.      No acute intra-abdominal process.      Normal appendix. No bowel obstruction.      Small fat containing periumbilical hernia without inflammation.      Bilateral L5 spondylolysis with minimal anterolisthesis of L5  relative to S1. Disc space narrowing and endplate sclerosis of L5-S1.  Robaxin    Medical Decision Making/Differential Diagnosis:  Differentials include not limited to musculoskeletal  versus kidney stone versus UTI versus pregnancy.  No signs of cauda equina syndrome.  No history of IV drug use no red flags.  Review  Pregnancy negative  Glucose 104  Electrolytes within normal limits  Normal renal function  Normal LFTs  Urine showed leukocytes no WBCs +1 bacteria protein  White blood cell count 7.1  Hemoglobin 12.7  Patient presents emergency department complaints of back pain history of scoliosis with back spasms.  Electrolytes within normal limits normal renal function normal LFTs.  Glucose 104 pregnancy negative.  No elevation white blood cell count patient is not anemic.  No red flags of loss of bowel or bladder to indicate cauda equina syndrome.  No numbness or tingling to the groin no saddle paresthesias.  No fever.  No history of IV drug use.  No fall.  CT of the abdomen pelvis showed no renal or ureteral calculi.  No hydronephrosis bilateral.  No acute intra-abdominal process.  Normal appendix no bowel obstruction patient does have a fat-containing periumbilical hernia without inflammation.  Bilateral L5 spondylosis with minimal  anterolisthesis relative to S1.  To space narrowing and endplate scoliosis of L5-S1 .  Peripheral pulses intact.  No abdominal pain on exam.  Urine did show leukocytes with +1 bacteria culture pending patient treated with Rocephin.  Ambulation trial was performed patient appeared to have improvement of her pain.  Was able to move back and forth on the bed when trying to sit up started screaming and saying she could not do it.  MRI of the lumbar and thoracic spine was ordered.  This was discussed with patient in great detail.  Amenable for plan while waiting for her MRI patient called out and states she just wanted to go home.  She went to the advanced medical advice.  I did discuss with her in great detail risk if this was a cauda equina syndrome or disc injury nerve root injury.  Risk of loss of limb inability perform activities of daily living organ damage and  even death verbalized understanding.  She is alert and oriented does not appear to be intoxicated and accept these risks.  Patient then was able to walk and ambulated out of the emergency department.  Was given a prescription for Medrol Dosepak for her symptoms but is reviewed with her.  Referral to orthopedics.  Prescription for UTI.  Advised if her symptoms return she has concern she is to immediately return back to the emergency department and verbalizes understanding  Patient leaving AGAINST MEDICAL ADVICE risk reviewed with her in great detail  Patient seen evaluated with attending physician Dr. Chatterjee .   Suspect that her back pain is more musculoskeletal in nature she is ambulating.  No saddle paresthesias.  No loss of bowel or bladder control.  Does not present like a pyelonephritis no CVA tenderness noted.  Suspect her UTI may be exacerbating her pain.  Patient refused further evaluation and treatment in the emergency department  PROCEDURES  Unless otherwise noted below, none      CONSULTS:   None      ED Course as of 09/05/24 1802   u Sep 05, 2024   1528 Patient resting in bed on her back in position of comfort on her phone.  I was able to to rock back-and-forth on the bed.  However when trying to sit up was unable to sit up secondary to severe pain in her back described as excruciating spasm.  Began to cry unable to bear weight.  MRI of the thoracic and lumbar spine ordered [TB]      ED Course User Index  [TB] BRITNI Iniguez-CNP         Diagnoses as of 09/05/24 1802   Acute cystitis without hematuria   Lumbar strain, initial encounter         This patient has remained hemodynamically stable during their ED course.      Critical Care: none       Counseling:  The emergency provider has spoken with the patient and discussed today’s results, in addition to providing specific details for the plan of care and counseling regarding the diagnosis and prognosis.  Questions are answered at this time and they  are agreeable with the plan.         --------------------------------- IMPRESSION AND DISPOSITION ---------------------------------    IMPRESSION  1. Acute cystitis without hematuria    2. Lumbar strain, initial encounter        DISPOSITION  Disposition: Leaving AGAINST MEDICAL ADVICE   Patient condition is stable improved        NOTE: This report was transcribed using voice recognition software. Every effort was made to ensure accuracy; however, inadvertent computerized transcription errors may be present      Sonali Zapata, BRITNI-ORQUIDEA  09/05/24 [id number]

## 2024-09-05 NOTE — DISCHARGE INSTRUCTIONS
Increase fluids  Follow-up with primary care physician in 2 days for reevaluation culture results  Avoid sex activity till symptom-free treatment is complete  Try schedule Tylenol without going to recommend a daily dosage to help with pain  Medrol Dosepak risk and benefits of steroids reviewed with you amenable to plan  No driving or operating machinery while taking the Flexeril this is a muscle relaxer  Today it is recommended that you stay for further evaluation and treatment MRI of your back.  Risk of been reviewed with you in great detail including loss of limb inability to perform activities of daily living organ damage death.  You are alert and oriented have decided to sign out AGAINST MEDICAL ADVICE.  Any worsening symptoms or concerns is recommended to return immediately to the emergency department.  Light stretching alternate ice and heat lidocaine patches over-the-counter use as directed

## 2024-09-05 NOTE — PROGRESS NOTES
Attestation/Supervisory note for ORQUIDEA Zapata      The patient is a 29-year-old female presenting to the emergency department for evaluation of a spasm in her left lower back.  She states that she does have a long history of muscle spasms in her back.  She states that she does not have a primary care physician and does not have insurance and does not have the ability to follow-up with any care.  She states that she was getting out of bed yesterday and she felt a pull in her back similar to previous back spasms.  She states that she did try to go to work but she also had pain while she was at work.  It was worse when she was moving around, bending and/or lifting.  She states that her pain was worse again today at home so she called EMS to bring her to the emergency room.  She has not taken anything for symptom relief yet.  No bowel or bladder incontinence.  No urinary retention.  No history of IV drug abuse.  No history of malignancy.  She denies any recent injury or trauma.  No fever or chills.  She states that it is just painful when she is walking and standing.  She denies any headache or visual changes.  No midline neck or back pain.  No chest pain or shortness of breath.  No abdominal pain.  No nausea vomiting.  No diarrhea or constipation.  No urinary complaints.  No vaginal discharge.  All pertinent positives and negatives are recorded above.  All other systems reviewed and otherwise negative.  Vital signs within normal limits.  Physical exam with a well-nourished well-developed female in no acute distress.  HEENT exam within normal limits.  She has no evidence of airway compromise or respiratory distress.  Abdominal exam is benign.  She does not have any focal midline neck or back pain with palpation.  She does have pain with palpation of the left lumbar region and left SI joint.  No visible or palpable bony deformities.  Strength is 5 of 5 in all 4 extremities.  Sensation is intact.  Reflexes are  intact.      The patient was reportedly given 200 mcg of fentanyl by EMS prior to arrival.      Oral acetaminophen, IV Toradol and Norflex ordered.  IM Decadron also ordered.      Diagnostic labs with evidence of acute lower urinary tract infection but otherwise unremarkable.      IV rocephin ordered for treatment of her urinary tract infection      CT abdomen pelvis wo IV contrast   Final Result   No renal or ureteral calculi. No hydroureteronephrosis bilaterally.        No acute intra-abdominal process.        Normal appendix. No bowel obstruction.        Small fat containing periumbilical hernia without inflammation.        Bilateral L5 spondylolysis with minimal anterolisthesis of L5   relative to S1. Disc space narrowing and endplate sclerosis of L5-S1.        MACRO:   None.        Signed by: Jose Luis Vazquez 9/5/2024 12:51 PM   Dictation workstation:   [id number]           The patient does not have any evidence of hemodynamic instability.  She does not have any significant lab normalities other than a urinary tract infection.  She is well-perfused.  She does not have any gross motor, neurologic or vascular episodes on exam.  She does not have any evidence of visible or palpable bony deformity on exam.  Her strength is 5/5 in all 4 extremities.  Sensation is intact.  Reflexes are intact.  She was able to walk and stand to the restroom.  The patient had a CT of her abdomen and pelvis that showed no acute process.  No evidence of ureteral or renal stone.  No evidence of acute appendicitis, diverticulitis, bowel obstruction.  The patient has received multiple medications to treat her symptoms but still reports significant pain with trying to walk and stand.  She does not feel comfortable going home at this time.  ESR and CRP were ordered although the patient does not have any findings of neurologic deficits on exam at this time.  Would consider MRI imaging of the L-spine if the patient does have persistent  symptoms and/or if the ESR and CRP are markedly elevated.  If the patient is not able to walk and stand prior to release, anticipate that she may need to be admitted for observation for intractable pain and further assessment prior to discharge.      ORQUIDEA Zapata will continue to manage patient primarily.  Anticipate disposition based on the results of the ESR, CRP and MRI imaging.      Impression/diagnosis:  Left lower back pain, acute on chronic  Acute lower urinary tract infection      I personally saw the patient and made/approve the management plan and take responsibility for the patient management.      I independently interpreted the following study (S) EKG and diagnostic labs      I personally discussed the patient's management with the      I reviewed the results of the diagnostic labs and diagnostic imaging.  Formal radiology read was completed by the radiologist.      Heidy Chatterjee MD

## 2024-09-05 NOTE — PROGRESS NOTES
Pharmacy Medication History Review    Gricelda Graff is a 29 y.o. female admitted for Back Pain. Pharmacy reviewed the patient's [id number] medications and allergies for accuracy.    The list below reflectives the updated PTA list. Please review each medication in order reconciliation for additional clarification and justification.  Prior to Admission medications    Not on File        The list below reflectives the updated allergy list. Please review each documented allergy for additional clarification and justification.  Allergies  Reviewed by Adilia Licona CPhT on 9/5/2024   No Known Allergies         Below are additional concerns with the patient's PTA list.  - pt stated she is not currently taking any prescription or OTC medications at this time    ADILIA LICONA CPhT

## 2024-09-05 NOTE — Clinical Note
Gricelda Graff was seen and treated in our emergency department on 9/5/2024.  She may return to work on 09/07/2024.  1-3 days if needed      If you have any questions or concerns, please don't hesitate to call.      Heidy Chatterjee MD

## 2024-09-06 LAB — BACTERIA UR CULT: NORMAL

## 2025-03-11 ENCOUNTER — HOSPITAL ENCOUNTER (EMERGENCY)
Facility: HOSPITAL | Age: 30
Discharge: HOME | End: 2025-03-11
Attending: EMERGENCY MEDICINE

## 2025-03-11 VITALS
SYSTOLIC BLOOD PRESSURE: 141 MMHG | DIASTOLIC BLOOD PRESSURE: 93 MMHG | HEART RATE: 69 BPM | TEMPERATURE: 97.9 F | OXYGEN SATURATION: 100 % | WEIGHT: 230 LBS | BODY MASS INDEX: 40.75 KG/M2 | HEIGHT: 63 IN | RESPIRATION RATE: 18 BRPM

## 2025-03-11 DIAGNOSIS — N39.0 URINARY TRACT INFECTION WITHOUT HEMATURIA, SITE UNSPECIFIED: ICD-10-CM

## 2025-03-11 DIAGNOSIS — R10.9 RIGHT FLANK PAIN: Primary | ICD-10-CM

## 2025-03-11 LAB
ALBUMIN SERPL BCP-MCNC: 4.2 G/DL (ref 3.4–5)
ALP SERPL-CCNC: 55 U/L (ref 33–110)
ALT SERPL W P-5'-P-CCNC: 11 U/L (ref 7–45)
ANION GAP SERPL CALCULATED.3IONS-SCNC: 10 MMOL/L (ref 10–20)
APPEARANCE UR: CLEAR
AST SERPL W P-5'-P-CCNC: 12 U/L (ref 9–39)
BASOPHILS # BLD AUTO: 0.02 X10*3/UL (ref 0–0.1)
BASOPHILS NFR BLD AUTO: 0.2 %
BILIRUB SERPL-MCNC: 0.3 MG/DL (ref 0–1.2)
BILIRUB UR STRIP.AUTO-MCNC: NEGATIVE MG/DL
BUN SERPL-MCNC: 21 MG/DL (ref 6–23)
CALCIUM SERPL-MCNC: 9.7 MG/DL (ref 8.6–10.3)
CHLORIDE SERPL-SCNC: 102 MMOL/L (ref 98–107)
CO2 SERPL-SCNC: 27 MMOL/L (ref 21–32)
COLOR UR: ABNORMAL
CREAT SERPL-MCNC: 0.85 MG/DL (ref 0.5–1.05)
EGFRCR SERPLBLD CKD-EPI 2021: >90 ML/MIN/1.73M*2
EOSINOPHIL # BLD AUTO: 0.1 X10*3/UL (ref 0–0.7)
EOSINOPHIL NFR BLD AUTO: 0.9 %
ERYTHROCYTE [DISTWIDTH] IN BLOOD BY AUTOMATED COUNT: 12.9 % (ref 11.5–14.5)
GLUCOSE SERPL-MCNC: 83 MG/DL (ref 74–99)
GLUCOSE UR STRIP.AUTO-MCNC: NORMAL MG/DL
HCT VFR BLD AUTO: 37 % (ref 36–46)
HGB BLD-MCNC: 12.3 G/DL (ref 12–16)
IMM GRANULOCYTES # BLD AUTO: 0.04 X10*3/UL (ref 0–0.7)
IMM GRANULOCYTES NFR BLD AUTO: 0.4 % (ref 0–0.9)
KETONES UR STRIP.AUTO-MCNC: NEGATIVE MG/DL
LEUKOCYTE ESTERASE UR QL STRIP.AUTO: ABNORMAL
LIPASE SERPL-CCNC: 23 U/L (ref 9–82)
LYMPHOCYTES # BLD AUTO: 3.38 X10*3/UL (ref 1.2–4.8)
LYMPHOCYTES NFR BLD AUTO: 31.2 %
MCH RBC QN AUTO: 27.8 PG (ref 26–34)
MCHC RBC AUTO-ENTMCNC: 33.2 G/DL (ref 32–36)
MCV RBC AUTO: 84 FL (ref 80–100)
MONOCYTES # BLD AUTO: 0.89 X10*3/UL (ref 0.1–1)
MONOCYTES NFR BLD AUTO: 8.2 %
MUCOUS THREADS #/AREA URNS AUTO: ABNORMAL /LPF
NEUTROPHILS # BLD AUTO: 6.39 X10*3/UL (ref 1.2–7.7)
NEUTROPHILS NFR BLD AUTO: 59.1 %
NITRITE UR QL STRIP.AUTO: NEGATIVE
NRBC BLD-RTO: 0 /100 WBCS (ref 0–0)
PH UR STRIP.AUTO: 6 [PH]
PLATELET # BLD AUTO: 297 X10*3/UL (ref 150–450)
POTASSIUM SERPL-SCNC: 3.7 MMOL/L (ref 3.5–5.3)
PROT SERPL-MCNC: 6.8 G/DL (ref 6.4–8.2)
PROT UR STRIP.AUTO-MCNC: NEGATIVE MG/DL
RBC # BLD AUTO: 4.42 X10*6/UL (ref 4–5.2)
RBC # UR STRIP.AUTO: NEGATIVE MG/DL
RBC #/AREA URNS AUTO: ABNORMAL /HPF
SODIUM SERPL-SCNC: 135 MMOL/L (ref 136–145)
SP GR UR STRIP.AUTO: 1.03
SQUAMOUS #/AREA URNS AUTO: ABNORMAL /HPF
UROBILINOGEN UR STRIP.AUTO-MCNC: NORMAL MG/DL
WBC # BLD AUTO: 10.8 X10*3/UL (ref 4.4–11.3)
WBC #/AREA URNS AUTO: ABNORMAL /HPF

## 2025-03-11 PROCEDURE — 81003 URINALYSIS AUTO W/O SCOPE: CPT | Performed by: EMERGENCY MEDICINE

## 2025-03-11 PROCEDURE — 87086 URINE CULTURE/COLONY COUNT: CPT | Mod: TRILAB | Performed by: EMERGENCY MEDICINE

## 2025-03-11 PROCEDURE — 80053 COMPREHEN METABOLIC PANEL: CPT | Performed by: EMERGENCY MEDICINE

## 2025-03-11 PROCEDURE — 96365 THER/PROPH/DIAG IV INF INIT: CPT

## 2025-03-11 PROCEDURE — 83690 ASSAY OF LIPASE: CPT | Performed by: EMERGENCY MEDICINE

## 2025-03-11 PROCEDURE — 36415 COLL VENOUS BLD VENIPUNCTURE: CPT | Performed by: EMERGENCY MEDICINE

## 2025-03-11 PROCEDURE — 85025 COMPLETE CBC W/AUTO DIFF WBC: CPT | Performed by: EMERGENCY MEDICINE

## 2025-03-11 PROCEDURE — 96375 TX/PRO/DX INJ NEW DRUG ADDON: CPT

## 2025-03-11 PROCEDURE — 2500000004 HC RX 250 GENERAL PHARMACY W/ HCPCS (ALT 636 FOR OP/ED): Performed by: EMERGENCY MEDICINE

## 2025-03-11 PROCEDURE — 99284 EMERGENCY DEPT VISIT MOD MDM: CPT | Mod: 25 | Performed by: EMERGENCY MEDICINE

## 2025-03-11 RX ORDER — KETOROLAC TROMETHAMINE 30 MG/ML
15 INJECTION, SOLUTION INTRAMUSCULAR; INTRAVENOUS ONCE
Status: COMPLETED | OUTPATIENT
Start: 2025-03-11 | End: 2025-03-11

## 2025-03-11 RX ORDER — CEFPODOXIME PROXETIL 200 MG/1
200 TABLET, FILM COATED ORAL 2 TIMES DAILY
Qty: 20 TABLET | Refills: 0 | Status: SHIPPED | OUTPATIENT
Start: 2025-03-11 | End: 2025-03-21

## 2025-03-11 RX ORDER — CEFTRIAXONE 1 G/50ML
1 INJECTION, SOLUTION INTRAVENOUS ONCE
Status: COMPLETED | OUTPATIENT
Start: 2025-03-11 | End: 2025-03-11

## 2025-03-11 RX ADMIN — CEFTRIAXONE 1 G: 1 INJECTION, SOLUTION INTRAVENOUS at 22:22

## 2025-03-11 RX ADMIN — KETOROLAC TROMETHAMINE 15 MG: 30 INJECTION, SOLUTION INTRAMUSCULAR at 22:21

## 2025-03-11 RX ADMIN — SODIUM CHLORIDE 1000 ML: 900 INJECTION, SOLUTION INTRAVENOUS at 22:21

## 2025-03-11 ASSESSMENT — PAIN DESCRIPTION - LOCATION: LOCATION: BACK

## 2025-03-11 ASSESSMENT — PAIN SCALES - GENERAL
PAINLEVEL_OUTOF10: 9
PAINLEVEL_OUTOF10: 2

## 2025-03-11 ASSESSMENT — PAIN DESCRIPTION - DESCRIPTORS
DESCRIPTORS: STABBING
DESCRIPTORS: STABBING

## 2025-03-11 ASSESSMENT — PAIN - FUNCTIONAL ASSESSMENT
PAIN_FUNCTIONAL_ASSESSMENT: 0-10
PAIN_FUNCTIONAL_ASSESSMENT: 0-10

## 2025-03-11 ASSESSMENT — PAIN DESCRIPTION - PAIN TYPE: TYPE: ACUTE PAIN

## 2025-03-11 ASSESSMENT — PAIN DESCRIPTION - DIRECTION: RADIATING_TOWARDS: ABDOMEN

## 2025-03-11 ASSESSMENT — PAIN DESCRIPTION - FREQUENCY: FREQUENCY: CONSTANT/CONTINUOUS

## 2025-03-11 ASSESSMENT — PAIN DESCRIPTION - ONSET: ONSET: ONGOING

## 2025-03-11 ASSESSMENT — PAIN DESCRIPTION - ORIENTATION: ORIENTATION: RIGHT;LOWER

## 2025-03-11 ASSESSMENT — PAIN DESCRIPTION - PROGRESSION: CLINICAL_PROGRESSION: GRADUALLY WORSENING

## 2025-03-12 LAB — HOLD SPECIMEN: NORMAL

## 2025-03-12 NOTE — DISCHARGE INSTRUCTIONS
Thank you for choosing [facility] and allowing me to take care of you today.     If your symptoms are not improving, begin to worsen, new symptoms develop/additional concerns arise, please return to the Emergency Department at any time for further evaluation and  treatment.     Dr. Irineo Easton Jr., D.O.     Follow-up with your primary care doctor or any emergency department in the next 2-3 days for re-evaluation and further treatment as deemed necessary    If symptoms/discomfort are not improving in the next 12-24 hours, feeling worse, developed high fever, or persistent vomiting, or new symptoms develop should be seen sooner by PCP or in any emergency department as more dangerous etiologies of symptoms may be early in the course of development upon initial presentation cannot be completely ruled out       Use 1000 mg acetaminophen with 400 mg ibuprofen every 6 hours as needed for pain for the next 5 days

## 2025-03-12 NOTE — ED PROVIDER NOTES
"EMERGENCY DEPARTMENT ENCOUNTER      Pt Name: Gricelda Graff  MRN: [id number]  Birthdate 1995  Date of evaluation: 3/11/2025  ED Provider: Irineo Easton DO     CHIEF COMPLAINT       Chief Complaint   Patient presents with   • Flank Pain     Pt reports for past week she has had UTI symptoms that has turned into right flank pain that radiates into the abdomen. Pt reports this feels similar to \"a really bad kidney infection and kidney I had a few years ago\"   • Abdominal Pain         HISTORY OF PRESENT ILLNESS   (Location/Symptom, Timing/Onset, Context/Setting, Quality, Duration, Modifying Factors, Severity) Note limiting factors.       HPI    Gricelda Graff is a 30 y.o. who presents to the emergency department ***      Nursing Notes were reviewed.    History obtained from :  patient    Outside records reviewed: N/A      History/Collateral information obtained from: N/A     Chronic conditions affecting care:     Social determinants of health:    Patient's PDMP reviewed personally    ED Course as of 03/11/25 2256   Tue Mar 11, 2025   2255 Patient is concerned empirically treat like early pyelonephritis discharged follows outpatient [SL]      ED Course User Index  [SL] Irieno Easton DO         Diagnoses as of 03/11/25 2256   Right flank pain   Urinary tract infection without hematuria, site unspecified        Discussion/ MDM:              REVIEW OF SYSTEMS     Review of Systems  Pertinent positives and negatives as per HPI    PAST MEDICAL HISTORY     Past Medical History:   Diagnosis Date   • Asthma ([facility])    • Body mass index (BMI) 45.0-49.9, adult (Multi) 10/15/2018    BMI 45.0-49.9, adult   • Kidney stone    • Migraine without aura, not intractable, without status migrainosus     Migraine without aura and without status migrainosus, not intractable   • Nicotine dependence, cigarettes, uncomplicated     Cigarette nicotine dependence without complication   • PCOS (polycystic ovarian " syndrome)        SURGICAL HISTORY       Past Surgical History:   Procedure Laterality Date   • ECTOPIC PREGNANCY SURGERY     • KIDNEY SURGERY     • TONSILLECTOMY  03/09/2018    Tonsillectomy       CURRENT MEDICATIONS       Previous Medications    CYCLOBENZAPRINE (FLEXERIL) 10 MG TABLET    Take 1 tablet (10 mg) by mouth 2 times a day as needed for muscle spasms for up to 5 days.       ALLERGIES     Patient has no known allergies.    FAMILY HISTORY     No family history on file.     SOCIAL HISTORY       Social History     Socioeconomic History   • Marital status: Single   Tobacco Use   • Smoking status: Some Days     Types: Cigarettes   • Smokeless tobacco: Never   Vaping Use   • Vaping status: Some Days   • Substances: Nicotine, Flavoring   • Devices: Disposable   Substance and Sexual Activity   • Alcohol use: Yes     Comment: socially   • Drug use: Never   • Sexual activity: Not Currently     Birth control/protection: None     Social Drivers of Health     Financial Resource Strain: Low Risk  (12/26/2023)    Overall Financial Resource Strain (CARDIA)    • Difficulty of Paying Living Expenses: Not hard at all   Food Insecurity: No Food Insecurity (12/26/2023)    Hunger Vital Sign    • Worried About Running Out of Food in the Last Year: Never true    • Ran Out of Food in the Last Year: Never true   Transportation Needs: No Transportation Needs (12/26/2023)    PRAPARE - Transportation    • Lack of Transportation (Medical): No    • Lack of Transportation (Non-Medical): No   Physical Activity: Insufficiently Active (12/26/2023)    Exercise Vital Sign    • Days of Exercise per Week: 2 days    • Minutes of Exercise per Session: 30 min   Stress: No Stress Concern Present (12/26/2023)    Citizen of [place] of Occupational Health - Occupational Stress Questionnaire    • Feeling of Stress : Not at all   Social Connections: Socially Isolated (12/26/2023)    Social Connection and Isolation Panel [NHANES]    • Frequency of  Communication with Friends and Family: More than three times a week    • Frequency of Social Gatherings with Friends and Family: Three times a week    • Attends Nondenominational Services: Never    • Active Member of Clubs or Organizations: No    • Attends Club or Organization Meetings: Never    • Marital Status: Never    Intimate Partner Violence: Not At Risk (12/26/2023)    Humiliation, Afraid, Rape, and Kick questionnaire    • Fear of Current or Ex-Partner: No    • Emotionally Abused: No    • Physically Abused: No    • Sexually Abused: No   Housing Stability: High Risk (4/23/2021)    [facility] [place] [place]    Housing Stability Vital Sign    • Unable to Pay for Housing in the Last Year: Yes    • Number of Places Lived in the Last Year: 1    • Unstable Housing in the Last Year: No       PHYSICAL EXAM       ED Triage Vitals [03/11/25 2204]   Temperature Heart Rate Respirations BP   36.6 °C (97.9 °F) 69 18 (!) 141/93      Pulse Ox Temp Source Heart Rate Source Patient Position   100 % Tympanic Monitor Sitting      BP Location FiO2 (%)     Left arm --         Physical Exam  ***  DIAGNOSTIC RESULTS     RADIOLOGY (Per Emergency Physician):     Interpretation per the Radiologist below, if available at the time of this note:  No orders to display         LABS:  Labs Reviewed   COMPREHENSIVE METABOLIC PANEL - Abnormal       Result Value    Glucose 83      Sodium 135 (*)     Potassium 3.7      Chloride 102      Bicarbonate 27      Anion Gap 10      Urea Nitrogen 21      Creatinine 0.85      eGFR >90      Calcium 9.7      Albumin 4.2      Alkaline Phosphatase 55      Total Protein 6.8      AST 12      Bilirubin, Total 0.3      ALT 11     URINALYSIS WITH REFLEX CULTURE AND MICROSCOPIC - Abnormal    Color, Urine Light-Yellow      Appearance, Urine Clear      Specific Gravity, Urine 1.031      pH, Urine 6.0      Protein, Urine NEGATIVE      Glucose, Urine Normal      Blood, Urine NEGATIVE      Ketones,  Urine NEGATIVE      Bilirubin, Urine NEGATIVE      Urobilinogen, Urine Normal      Nitrite, Urine NEGATIVE      Leukocyte Esterase, Urine 75 Ceasar/uL (*)    MICROSCOPIC ONLY, URINE - Abnormal    WBC, Urine 6-10 (*)     RBC, Urine 1-2      Squamous Epithelial Cells, Urine 1-9 (SPARSE)      Mucus, Urine FEW     LIPASE - Normal    Lipase 23      Narrative:     Venipuncture immediately after or during the administration of Metamizole may lead to falsely low results. Testing should be performed immediately prior to Metamizole dosing.   URINE CULTURE   CBC WITH AUTO DIFFERENTIAL    WBC 10.8      nRBC 0.0      RBC 4.42      Hemoglobin 12.3      Hematocrit 37.0      MCV 84      MCH 27.8      MCHC 33.2      RDW 12.9      Platelets 297      Neutrophils % 59.1      Immature Granulocytes %, Automated 0.4      Lymphocytes % 31.2      Monocytes % 8.2      Eosinophils % 0.9      Basophils % 0.2      Neutrophils Absolute 6.39      Immature Granulocytes Absolute, Automated 0.04      Lymphocytes Absolute 3.38      Monocytes Absolute 0.89      Eosinophils Absolute 0.10      Basophils Absolute 0.02     URINALYSIS WITH REFLEX CULTURE AND MICROSCOPIC    Narrative:     The following orders were created for panel order Urinalysis with Reflex Culture and Microscopic.  Procedure                               Abnormality         Status                     ---------                               -----------         ------                     Urinalysis with Reflex C...[[id number]]  Abnormal            Final result               Extra Urine Gray Tube[[id number]]                            In process                   Please view results for these tests on the individual orders.   EXTRA URINE GRAY TUBE   POCT PREGNANCY, URINE       All other labs were within normal range or not returned as of this dictation.    EMERGENCY DEPARTMENT COURSE :   Vitals:    Vitals:    03/11/25 2204   BP: (!) 141/93   BP Location: Left arm   Patient Position: Sitting  "  Pulse: 69   Resp: 18   Temp: 36.6 °C (97.9 °F)   TempSrc: Tympanic   SpO2: 100%   Weight: 104 kg (230 lb)   Height: 1.6 m (5' 3\")       Medications   sodium chloride 0.9 % bolus 1,000 mL (1,000 mL intravenous New Bag 3/11/25 2221)   cefTRIAXone (Rocephin) 1 g in dextrose (iso) IV 50 mL (1 g intravenous New Bag 3/11/25 2222)   ketorolac (Toradol) injection 15 mg (15 mg intravenous Given 3/11/25 2221)       ***      PROCEDURES:  Unless otherwise noted below, none     Procedures    CRITICAL CARE TIME   {Qualifies for Critical Care Time? (Optional):71203}    FINAL IMPRESSION    No diagnosis found.      DISPOSITION         PATIENT REFERRED TO:  No follow-up provider specified.    DISCHARGE MEDICATIONS:  New Prescriptions    No medications on file          (Comment: Please note this report has been produced using speech recognition software and may contain errors related to that system including errors in grammar, punctuation, and spelling, as well as words and phrases that may be inappropriate.  If there are any questions or concerns please feel free to contact the dictating provider for clarification.)    Irineo Easton,  (electronically signed)  Emergency Medicine Provider  " "(!) 141/93   BP Location: Left arm   Patient Position: Sitting   Pulse: 69   Resp: 18   Temp: 36.6 °C (97.9 °F)   TempSrc: Tympanic   SpO2: 100%   Weight: 104 kg (230 lb)   Height: 1.6 m (5' 3\")       Medications   sodium chloride 0.9 % bolus 1,000 mL (0 mL intravenous Stopped 3/11/25 2311)   cefTRIAXone (Rocephin) 1 g in dextrose (iso) IV 50 mL (0 g intravenous Stopped 3/11/25 2311)   ketorolac (Toradol) injection 15 mg (15 mg intravenous Given 3/11/25 2221)           PROCEDURES:  Unless otherwise noted below, none     Procedures    CRITICAL CARE TIME       FINAL IMPRESSION      1. Right flank pain    2. Urinary tract infection without hematuria, site unspecified          DISPOSITION    Discharge 03/11/2025 10:56:08 PM    PATIENT REFERRED TO:  No follow-up provider specified.    DISCHARGE MEDICATIONS:  Discharge Medication List as of 3/11/2025 10:57 PM        START taking these medications    Details   cefpodoxime (Vantin) 200 mg tablet Take 1 tablet (200 mg) by mouth 2 times a day for 10 days., Starting Tue 3/11/2025, Until Fri 3/21/2025, Normal                (Comment: Please note this report has been produced using speech recognition software and may contain errors related to that system including errors in grammar, punctuation, and spelling, as well as words and phrases that may be inappropriate.  If there are any questions or concerns please feel free to contact the dictating provider for clarification.)    Irineo Easton DO (electronically signed)  Emergency Medicine Provider     Irineo Easton DO  03/21/25 1508    "

## 2025-03-13 LAB — BACTERIA UR CULT: NORMAL

## (undated) DEVICE — BASIN SET, D & C

## (undated) DEVICE — Device

## (undated) DEVICE — MANIFOLD, 4 PORT NEPTUNE STANDARD

## (undated) DEVICE — COVER, CART, 45 X 27 X 48 IN, CLEAR

## (undated) DEVICE — MARKER, SKIN, RULER AND LABEL PACK, CUSTOM

## (undated) DEVICE — PREP TRAY, SKIN, DRY, W/GLOVES

## (undated) DEVICE — DRESSING, NON-ADHERENT, TELFA, OUCHLESS, 3 X 8 IN, STERILE

## (undated) DEVICE — LIGASURE, V SEALER/DIVIDER  5MM BLUNT TIP

## (undated) DEVICE — REST, HEAD, BAGEL, 9 IN

## (undated) DEVICE — SLEEVE, SURGICAL, 21.5 X 5.5 IN, LF, STERILE

## (undated) DEVICE — TUBE SET, PNEUMOCLEAR, SMOKE EVACU, HIGH-FLOW

## (undated) DEVICE — PUMP, STRYKERFLOW 2 & HANDPIECE W/10FT. IRRIGATION TUBING

## (undated) DEVICE — SYSTEM, FIOS FIRST ENTRY, 5 X 100MM, KII ADVANCED FIXATION

## (undated) DEVICE — SPONGE, GAUZE, XRAY DECT, 16 PLY, 4 X 4, W/MASTER DMT,STERILE

## (undated) DEVICE — TROCAR SYSTEM, BALLOON, KII GELPORT, 12 X 100MM

## (undated) DEVICE — DRAPE, PAD, PREP, W/ 9 IN CUFF, 24 X 41, LF, NS

## (undated) DEVICE — SUTURE, VICRYL, 0, 27 IN, UR-6, VIOLET

## (undated) DEVICE — SYRINGE, LUER LOCK, 12ML

## (undated) DEVICE — TRAY, MINOR, SINGLE BASIN, STERILE

## (undated) DEVICE — HOLSTER, JET SAFETY

## (undated) DEVICE — CATHETER, URETHRAL, ROBNEL, 16 FR, 16 IN, LF, RED

## (undated) DEVICE — COVER, LIGHT HANDLE, SURGICAL, FLEXIBLE, DISPOSABLE, STERILE

## (undated) DEVICE — RETRIEVAL SYSTEM, MONARCH INZII, 5MM